# Patient Record
Sex: MALE | Race: WHITE | NOT HISPANIC OR LATINO | Employment: OTHER | URBAN - METROPOLITAN AREA
[De-identification: names, ages, dates, MRNs, and addresses within clinical notes are randomized per-mention and may not be internally consistent; named-entity substitution may affect disease eponyms.]

---

## 2023-10-20 ENCOUNTER — TELEPHONE (OUTPATIENT)
Dept: PRIMARY CARE | Facility: CLINIC | Age: 68
End: 2023-10-20
Payer: MEDICARE

## 2023-10-20 DIAGNOSIS — Z79.4 TYPE 2 DIABETES MELLITUS WITH RETINOPATHY, WITH LONG-TERM CURRENT USE OF INSULIN, MACULAR EDEMA PRESENCE UNSPECIFIED, UNSPECIFIED LATERALITY, UNSPECIFIED RETINOPATHY SEVERITY (MULTI): Primary | ICD-10-CM

## 2023-10-20 DIAGNOSIS — E11.319 TYPE 2 DIABETES MELLITUS WITH RETINOPATHY, WITH LONG-TERM CURRENT USE OF INSULIN, MACULAR EDEMA PRESENCE UNSPECIFIED, UNSPECIFIED LATERALITY, UNSPECIFIED RETINOPATHY SEVERITY (MULTI): Primary | ICD-10-CM

## 2023-10-20 RX ORDER — BLOOD-GLUCOSE SENSOR
EACH MISCELLANEOUS
Qty: 2 EACH | Refills: 11 | Status: SHIPPED | OUTPATIENT
Start: 2023-10-20 | End: 2024-05-14 | Stop reason: SDUPTHER

## 2023-10-20 NOTE — TELEPHONE ENCOUNTER
Patient walked in he is interested in Faith 3 reader and sensors but his pharmacy will not give he a price without a prescription.  Ph-[facility] [phone].  Patient phone [phone].

## 2023-11-03 ENCOUNTER — TELEPHONE (OUTPATIENT)
Dept: PRIMARY CARE | Facility: CLINIC | Age: 68
End: 2023-11-03
Payer: MEDICARE

## 2023-11-03 NOTE — TELEPHONE ENCOUNTER
[facility] left a message on rx refill line that patient ordered a freestyle jose III reader and paid out of pocket.  They can not release order without order signed and returned to them.  Reference #[id number].  I called patient he does want freestyle jose II from [facility].  I called [facility] and was told they faxed the form on 10/30/23 and today.

## 2023-12-01 ENCOUNTER — LAB (OUTPATIENT)
Dept: LAB | Facility: LAB | Age: 68
End: 2023-12-01
Payer: MEDICARE

## 2023-12-01 DIAGNOSIS — E11.3419: ICD-10-CM

## 2023-12-01 DIAGNOSIS — E11.9 TYPE 2 DIABETES MELLITUS WITHOUT COMPLICATIONS (MULTI): Primary | ICD-10-CM

## 2023-12-01 LAB
ALBUMIN SERPL BCP-MCNC: 4 G/DL (ref 3.4–5)
ALP SERPL-CCNC: 67 U/L (ref 33–136)
ALT SERPL W P-5'-P-CCNC: 19 U/L (ref 10–52)
ANION GAP SERPL CALC-SCNC: 14 MMOL/L (ref 10–20)
AST SERPL W P-5'-P-CCNC: 16 U/L (ref 9–39)
BILIRUB SERPL-MCNC: 0.5 MG/DL (ref 0–1.2)
BUN SERPL-MCNC: 23 MG/DL (ref 6–23)
CALCIUM SERPL-MCNC: 9 MG/DL (ref 8.6–10.6)
CHLORIDE SERPL-SCNC: 100 MMOL/L (ref 98–107)
CHOLEST SERPL-MCNC: 160 MG/DL (ref 0–199)
CHOLESTEROL/HDL RATIO: 3.1
CO2 SERPL-SCNC: 23 MMOL/L (ref 21–32)
CREAT SERPL-MCNC: 1.13 MG/DL (ref 0.5–1.3)
EST. AVERAGE GLUCOSE BLD GHB EST-MCNC: 174 MG/DL
GFR SERPL CREATININE-BSD FRML MDRD: 72 ML/MIN/1.73M*2
GLUCOSE SERPL-MCNC: 195 MG/DL (ref 74–99)
HBA1C MFR BLD: 7.7 %
HDLC SERPL-MCNC: 52 MG/DL
LDLC SERPL CALC-MCNC: 95 MG/DL
NON HDL CHOLESTEROL: 108 MG/DL (ref 0–149)
POTASSIUM SERPL-SCNC: 4.9 MMOL/L (ref 3.5–5.3)
PROT SERPL-MCNC: 6.2 G/DL (ref 6.4–8.2)
SODIUM SERPL-SCNC: 132 MMOL/L (ref 136–145)
TRIGL SERPL-MCNC: 67 MG/DL (ref 0–149)
VLDL: 13 MG/DL (ref 0–40)

## 2023-12-01 PROCEDURE — 36415 COLL VENOUS BLD VENIPUNCTURE: CPT

## 2023-12-01 PROCEDURE — 83036 HEMOGLOBIN GLYCOSYLATED A1C: CPT

## 2023-12-01 PROCEDURE — 80061 LIPID PANEL: CPT

## 2023-12-01 PROCEDURE — 80053 COMPREHEN METABOLIC PANEL: CPT

## 2023-12-08 ENCOUNTER — LAB (OUTPATIENT)
Dept: LAB | Facility: LAB | Age: 68
End: 2023-12-08
Payer: MEDICARE

## 2023-12-08 ENCOUNTER — OFFICE VISIT (OUTPATIENT)
Dept: PRIMARY CARE | Facility: CLINIC | Age: 68
End: 2023-12-08
Payer: MEDICARE

## 2023-12-08 VITALS
HEART RATE: 89 BPM | OXYGEN SATURATION: 98 % | HEIGHT: 74 IN | SYSTOLIC BLOOD PRESSURE: 131 MMHG | WEIGHT: 256 LBS | TEMPERATURE: 96.8 F | BODY MASS INDEX: 32.85 KG/M2 | DIASTOLIC BLOOD PRESSURE: 65 MMHG

## 2023-12-08 DIAGNOSIS — E78.00 PURE HYPERCHOLESTEROLEMIA: ICD-10-CM

## 2023-12-08 DIAGNOSIS — I10 HYPERTENSION, UNSPECIFIED TYPE: Primary | ICD-10-CM

## 2023-12-08 DIAGNOSIS — E55.9 VITAMIN D DEFICIENCY: ICD-10-CM

## 2023-12-08 DIAGNOSIS — E11.42 DIABETIC POLYNEUROPATHY ASSOCIATED WITH TYPE 2 DIABETES MELLITUS (MULTI): ICD-10-CM

## 2023-12-08 DIAGNOSIS — Z79.4 TYPE 2 DIABETES MELLITUS WITH RETINOPATHY, WITH LONG-TERM CURRENT USE OF INSULIN, MACULAR EDEMA PRESENCE UNSPECIFIED, UNSPECIFIED LATERALITY, UNSPECIFIED RETINOPATHY SEVERITY (MULTI): ICD-10-CM

## 2023-12-08 DIAGNOSIS — E11.319 TYPE 2 DIABETES MELLITUS WITH RETINOPATHY, WITH LONG-TERM CURRENT USE OF INSULIN, MACULAR EDEMA PRESENCE UNSPECIFIED, UNSPECIFIED LATERALITY, UNSPECIFIED RETINOPATHY SEVERITY (MULTI): ICD-10-CM

## 2023-12-08 PROBLEM — R05.9 COUGH: Status: ACTIVE | Noted: 2023-12-08

## 2023-12-08 PROBLEM — E11.9 DIABETES (MULTI): Status: ACTIVE | Noted: 2023-12-08

## 2023-12-08 PROBLEM — E11.40 DIABETIC NEUROPATHY (MULTI): Status: ACTIVE | Noted: 2023-12-08

## 2023-12-08 PROBLEM — R53.83 FATIGUE: Status: ACTIVE | Noted: 2023-12-08

## 2023-12-08 PROBLEM — U07.1 COVID-19 VIRUS INFECTION: Status: ACTIVE | Noted: 2023-12-08

## 2023-12-08 PROBLEM — R42 DIZZINESS: Status: ACTIVE | Noted: 2023-12-08

## 2023-12-08 PROCEDURE — 3075F SYST BP GE 130 - 139MM HG: CPT | Performed by: NURSE PRACTITIONER

## 2023-12-08 PROCEDURE — 82570 ASSAY OF URINE CREATININE: CPT

## 2023-12-08 PROCEDURE — 1126F AMNT PAIN NOTED NONE PRSNT: CPT | Performed by: NURSE PRACTITIONER

## 2023-12-08 PROCEDURE — 3051F HG A1C>EQUAL 7.0%<8.0%: CPT | Performed by: NURSE PRACTITIONER

## 2023-12-08 PROCEDURE — 3078F DIAST BP <80 MM HG: CPT | Performed by: NURSE PRACTITIONER

## 2023-12-08 PROCEDURE — 4010F ACE/ARB THERAPY RXD/TAKEN: CPT | Performed by: NURSE PRACTITIONER

## 2023-12-08 PROCEDURE — 84156 ASSAY OF PROTEIN URINE: CPT

## 2023-12-08 PROCEDURE — 1159F MED LIST DOCD IN RCRD: CPT | Performed by: NURSE PRACTITIONER

## 2023-12-08 PROCEDURE — 1160F RVW MEDS BY RX/DR IN RCRD: CPT | Performed by: NURSE PRACTITIONER

## 2023-12-08 PROCEDURE — 99214 OFFICE O/P EST MOD 30 MIN: CPT | Performed by: NURSE PRACTITIONER

## 2023-12-08 PROCEDURE — 1036F TOBACCO NON-USER: CPT | Performed by: NURSE PRACTITIONER

## 2023-12-08 PROCEDURE — 3048F LDL-C <100 MG/DL: CPT | Performed by: NURSE PRACTITIONER

## 2023-12-08 PROCEDURE — 3060F POS MICROALBUMINURIA REV: CPT | Performed by: NURSE PRACTITIONER

## 2023-12-08 RX ORDER — INSULIN LISPRO 100 [IU]/ML
INJECTION, SOLUTION INTRAVENOUS; SUBCUTANEOUS
COMMUNITY
End: 2023-12-08 | Stop reason: SDUPTHER

## 2023-12-08 RX ORDER — LORATADINE, PSEUDOEPHEDRINE 5; 120 MG/1; MG/1
TABLET, EXTENDED RELEASE ORAL
COMMUNITY
Start: 2023-11-07

## 2023-12-08 RX ORDER — ACETAMINOPHEN 500 MG
1000 TABLET ORAL DAILY
COMMUNITY
Start: 2021-12-06

## 2023-12-08 RX ORDER — GLUCOSAM/CHONDRO/HERB 149/HYAL 750-100 MG
1000 TABLET ORAL 2 TIMES DAILY
COMMUNITY

## 2023-12-08 RX ORDER — INSULIN GLARGINE 300 U/ML
20 INJECTION, SOLUTION SUBCUTANEOUS NIGHTLY
COMMUNITY

## 2023-12-08 RX ORDER — MULTIVIT-MIN/IRON FUM/FOLIC AC 7.5 MG-4
1 TABLET ORAL DAILY
COMMUNITY

## 2023-12-08 RX ORDER — INSULIN LISPRO 100 [IU]/ML
INJECTION, SOLUTION INTRAVENOUS; SUBCUTANEOUS
Qty: 30 EACH | Refills: 3 | Status: SHIPPED | OUTPATIENT
Start: 2023-12-08

## 2023-12-08 RX ORDER — LISINOPRIL 20 MG/1
20 TABLET ORAL DAILY
COMMUNITY
End: 2024-01-29 | Stop reason: SDUPTHER

## 2023-12-08 RX ORDER — ASPIRIN 81 MG/1
81 TABLET ORAL DAILY
COMMUNITY
Start: 2014-03-11

## 2023-12-08 RX ORDER — ATORVASTATIN CALCIUM 10 MG/1
10 TABLET, FILM COATED ORAL DAILY
COMMUNITY

## 2023-12-08 ASSESSMENT — PAIN SCALES - GENERAL: PAINLEVEL: 0-NO PAIN

## 2023-12-08 ASSESSMENT — PATIENT HEALTH QUESTIONNAIRE - PHQ9
2. FEELING DOWN, DEPRESSED OR HOPELESS: NOT AT ALL
SUM OF ALL RESPONSES TO PHQ9 QUESTIONS 1 AND 2: 0
1. LITTLE INTEREST OR PLEASURE IN DOING THINGS: NOT AT ALL

## 2023-12-08 NOTE — PROGRESS NOTES
Chief Complaint  Diabetes and Hypertension.    History Of Present Illness  Chris Andrade is a 68 y.o. male presents today for follow up of Diabetes and Hypertension.    Has been exercising and dieting (limiting carbs).  Continues to loose weight (intentional).     Hypertension- compliant w medications. Since last visit evaluated by cardiologist Dr Jean (referred due to CT Cardiac score 299).  Started on ASA 81 daily and advised to increase statin to 20 mg daily.  States had tried this however experienced more muscle aches and has went down to atorvastatin 10 mg again.  Reports occasional dizziness with changes in position, mildly Ortho VS positive again today.    Has not been drinking enough water and has been drinking lots of coffee over the last several weeks.    Hyperlipidemia-continues on atorvastatin 10 mg daily.  Did not tolerate increased to 20 mg as was suggested by cardiologist Dr Jean.  Labs reviewed.  Has improved lifestyle-diet and exercise.     Diabetes with neuropathy/diabetes with retinopathy  -Last hemoglobin A1c 12/1/2023 was 7.7  -Monitors blood sugars with freestyle jose continuous glucose monitor  -Toujeo to 20 u at night   -Take humalog 10-15 u with meals  -Continue to follow-up with ophthalmology Dr. [facility]-Retinal steoid injection 1 month ago [facility].   -Podiatry referral recommended, advised patient to avoid barefoot ambulation, always needs protective feet coverings (shoes) to avoid accidental injury     HM  -Immunizations: flu- reviewed, declined, Recommended: COVID vacc, shingles, pneum and DTaP  -Routine dental visit every 6 months recommended  Mom had Guillain-Homeworth after influenza vaccine, therefore states he will not be taking the influenza vaccines now or in future.   Reviewed Medications, PMH, PSH, social hx, Fam hx, Immunizations, medications and allergies.      Pneumonia vaccine- declines at this time. Mom had guillan Homeworth therefore does not  "want vaccines.         Review of Systems  Review of Systems   Constitutional:  Negative for activity change and appetite change.   HENT:  Negative for congestion.    Eyes:  Negative for pain and itching.   Respiratory:  Negative for cough, shortness of breath and wheezing.    Cardiovascular:  Negative for chest pain, palpitations and leg swelling.   Gastrointestinal:  Negative for abdominal distention, abdominal pain, constipation, diarrhea and vomiting.   Genitourinary:  Negative for dysuria, frequency, hematuria and urgency.   Musculoskeletal:  Negative for arthralgias and gait problem.   Skin:  Negative for rash and wound.   Neurological:  Positive for dizziness. Negative for numbness.   Psychiatric/Behavioral:  The patient is not nervous/anxious.        Past Medical History  He has no past medical history on file.    Surgical History  He has no past surgical history on file.    Family History  No family history on file.     Social History  He reports that he has quit smoking. His smoking use included cigarettes. He has never used smokeless tobacco. He reports that he does not currently use alcohol. He reports that he does not use drugs.    Allergies  Patient has no known allergies.    Medications  Current Outpatient Medications   Medication Instructions    aspirin 81 mg, oral, Daily    atorvastatin (LIPITOR) 10 mg, oral, Daily    blood-glucose sensor (FreeStyle Faith 3 Sensor) device Apply 1 sensor every 2 weeks    cholecalciferol (VITAMIN D-3) 1,000 Units, oral, Daily    HumaLOG KwikPen Insulin 100 unit/mL injection inject 30 units three times a day to cover daily meals    lisinopril 20 mg, oral, Daily    multivitamin with minerals tablet 1 tablet, oral, Daily    omega 3-dha-epa-fish oil (Fish OiL) 1,000 mg (120 mg-180 mg) capsule 1,000 mg, oral, 2 times daily    Toujeo SoloStar U-300 Insulin 300 unit/mL (1.5 mL) injection Inject 20 Units under the skin once daily at bedtime.    Unifine Pentips 31 gauge x 3/16\" " "needle Use to inject insulin 4 times daily        Objective   /65   Pulse 89   Temp 36 °C (96.8 °F) (Temporal)   Ht 1.88 m (6' 2\")   Wt 116 kg (256 lb)   SpO2 98%   BMI 32.87 kg/m²    BMI: Estimated body mass index is 32.87 kg/m² as calculated from the following:    Height as of this encounter: 1.88 m (6' 2\").    Weight as of this encounter: 116 kg (256 lb).    Physical Exam  Physical Exam  Vitals and nursing note reviewed.   Constitutional:       Appearance: Normal appearance.   HENT:      Head: Normocephalic and atraumatic.      Nose: Nose normal.      Mouth/Throat:      Mouth: Mucous membranes are moist.      Pharynx: Oropharynx is clear.   Eyes:      Extraocular Movements: Extraocular movements intact.      Conjunctiva/sclera: Conjunctivae normal.      Pupils: Pupils are equal, round, and reactive to light.   Cardiovascular:      Rate and Rhythm: Normal rate and regular rhythm.      Pulses: Normal pulses.      Heart sounds: Normal heart sounds.   Pulmonary:      Effort: Pulmonary effort is normal.      Breath sounds: Normal breath sounds.   Abdominal:      General: Bowel sounds are normal.      Palpations: Abdomen is soft.      Tenderness: There is no abdominal tenderness.   Musculoskeletal:         General: Normal range of motion.      Cervical back: Neck supple.   Skin:     General: Skin is warm and dry.   Neurological:      General: No focal deficit present.      Mental Status: He is alert and oriented to person, place, and time. Mental status is at baseline.   Psychiatric:         Mood and Affect: Mood normal.         Behavior: Behavior normal.         Thought Content: Thought content normal.         Judgment: Judgment normal.         Relevant Results and Imaging  Lab on 12/08/2023   Component Date Value Ref Range Status    Total Protein, Urine Random 12/08/2023 42 (H)  5 - 25 mg/dL Final    Creatinine, Urine Random 12/08/2023 59.8  20.0 - 370.0 mg/dL Final    T. Protein/Creatinine Ratio " 12/08/2023 0.70 (H)  0.00 - 0.17 mg/mg Creat Final   Lab on 12/01/2023   Component Date Value Ref Range Status    Glucose 12/01/2023 195 (H)  74 - 99 mg/dL Final    Sodium 12/01/2023 132 (L)  136 - 145 mmol/L Final    Potassium 12/01/2023 4.9  3.5 - 5.3 mmol/L Final    Chloride 12/01/2023 100  98 - 107 mmol/L Final    Bicarbonate 12/01/2023 23  21 - 32 mmol/L Final    Anion Gap 12/01/2023 14  10 - 20 mmol/L Final    Urea Nitrogen 12/01/2023 23  6 - 23 mg/dL Final    Creatinine 12/01/2023 1.13  0.50 - 1.30 mg/dL Final    eGFR 12/01/2023 72  >60 mL/min/1.73m*2 Final    Calculations of estimated GFR are performed using the 2021 CKD-EPI Study Refit equation without the race variable for the IDMS-Traceable creatinine methods.  https://jasn.asnjournals.org/content/early/2021/09/22/ASN.6220958561    Calcium 12/01/2023 9.0  8.6 - 10.6 mg/dL Final    Albumin 12/01/2023 4.0  3.4 - 5.0 g/dL Final    Alkaline Phosphatase 12/01/2023 67  33 - 136 U/L Final    Total Protein 12/01/2023 6.2 (L)  6.4 - 8.2 g/dL Final    AST 12/01/2023 16  9 - 39 U/L Final    Bilirubin, Total 12/01/2023 0.5  0.0 - 1.2 mg/dL Final    ALT 12/01/2023 19  10 - 52 U/L Final    Patients treated with Sulfasalazine may generate falsely decreased results for ALT.    Hemoglobin A1C 12/01/2023 7.7 (H)  see below % Final    Estimated Average Glucose 12/01/2023 174  Not Established mg/dL Final    Cholesterol 12/01/2023 160  0 - 199 mg/dL Final          Age      Desirable   Borderline High   High     0-19 Y     0 - 169       170 - 199     >/= 200    20-24 Y     0 - 189       190 - 224     >/= 225         >24 Y     0 - 199       200 - 239     >/= 240   **All ranges are based on fasting samples. Specific   therapeutic targets will vary based on patient-specific   cardiac risk.    Pediatric guidelines reference:Pediatrics 2011, 128(S5).Adult guidelines reference: NCEP ATPIII Guidelines,KALIN 2001, 258:2486-97    Venipuncture immediately after or during the  administration of Metamizole may lead to falsely low results. Testing should be performed immediately prior to Metamizole dosing.    HDL-Cholesterol 12/01/2023 52.0  mg/dL Final      Age       Very Low   Low     Normal    High    0-19 Y    < 35      < 40     40-45     ----  20-24 Y    ----     < 40      >45      ----        >24 Y      ----     < 40     40-60      >60      Cholesterol/HDL Ratio 12/01/2023 3.1   Final      Ref Values  Desirable  < 3.4  High Risk  > 5.0    LDL Calculated 12/01/2023 95  <=99 mg/dL Final                                Near   Borderline      AGE      Desirable  Optimal    High     High     Very High     0-19 Y     0 - 109     ---    110-129   >/= 130     ----    20-24 Y     0 - 119     ---    120-159   >/= 160     ----      >24 Y     0 -  99   100-129  130-159   160-189     >/=190      VLDL 12/01/2023 13  0 - 40 mg/dL Final    Triglycerides 12/01/2023 67  0 - 149 mg/dL Final       Age         Desirable   Borderline High   High     Very High   0 D-90 D    19 - 174         ----         ----        ----  91 D- 9 Y     0 -  74        75 -  99     >/= 100      ----    10-19 Y     0 -  89        90 - 129     >/= 130      ----    20-24 Y     0 - 114       115 - 149     >/= 150      ----         >24 Y     0 - 149       150 - 199    200- 499    >/= 500    Venipuncture immediately after or during the administration of Metamizole may lead to falsely low results. Testing should be performed immediately prior to Metamizole dosing.    Non HDL Cholesterol 12/01/2023 108  0 - 149 mg/dL Final          Age       Desirable   Borderline High   High     Very High     0-19 Y     0 - 119       120 - 144     >/= 145    >/= 160    20-24 Y     0 - 149       150 - 189     >/= 190      ----         >24 Y    30 mg/dL above LDL Cholesterol goal       No images are attached to the encounter.        Assessment and Plan  Assessment/Plan   Problem List Items Addressed This Visit             ICD-10-CM    Diabetic  neuropathy (CMS/HCC) E11.40    Relevant Orders    Protein, Urine Random (Completed)    Follow Up In Primary Care - Established    Basic metabolic panel    Hemoglobin A1C    Hypertension - Primary I10     -chronic, stable   -8/24/23 CT Cardiac score 299  -follows w Cards Dr Jean   -cont ASA, lisinopril 20 mg   -ortho VS+-- advised to increase hydration          Relevant Orders    Follow Up In Primary Care - Established    Pure hypercholesterolemia E78.00     -chronic, stable   -lipid panel 12/1/23- wnl   -cont atorvastatin 10 mg at this time (did not tolerate increase to 20 mg as advised by cardiologist Dr Jean due to elevated CT Cardiac score (299) --due to muscle aches)         Type 2 diabetes mellitus (CMS/HCC) E11.9     -did not tolerate metformin in past ([facility])   --Last hemoglobin A1c 12/1/2023 was 7.7  -Monitors blood sugars with freestyle jose continuous glucose monitor  -Toujeo to 20 u at night   -Take humalog 10-15 u with meals  -Continue to follow-up with ophthalmology Dr[facility]-Retinal steoid injection 1 month ago [facility].   -Podiatry referral recommended, advised patient to avoid barefoot ambulation, always needs protective feet coverings (shoes) to avoid accidental injury         Relevant Medications    HumaLOG KwikPen Insulin 100 unit/mL injection     Other Visit Diagnoses         Codes    Vitamin D deficiency     E55.9    Relevant Orders    Vitamin D 25-Hydroxy,Total (for eval of Vitamin D levels)

## 2023-12-09 LAB
CREAT UR-MCNC: 59.8 MG/DL (ref 20–370)
PROT UR-ACNC: 42 MG/DL (ref 5–25)
PROT/CREAT UR: 0.7 MG/MG CREAT (ref 0–0.17)

## 2023-12-09 ASSESSMENT — ENCOUNTER SYMPTOMS
COUGH: 0
DIARRHEA: 0
APPETITE CHANGE: 0
ACTIVITY CHANGE: 0
CONSTIPATION: 0
NUMBNESS: 0
DYSURIA: 0
SHORTNESS OF BREATH: 0
NERVOUS/ANXIOUS: 0
EYE ITCHING: 0
ARTHRALGIAS: 0
DIZZINESS: 1
ABDOMINAL PAIN: 0
HEMATURIA: 0
ABDOMINAL DISTENTION: 0
PALPITATIONS: 0
WHEEZING: 0
EYE PAIN: 0
VOMITING: 0
WOUND: 0
FREQUENCY: 0

## 2023-12-09 NOTE — ASSESSMENT & PLAN NOTE
-chronic, stable   -8/24/23 CT Cardiac score 299  -follows w Lu Jean   -cont ASA, lisinopril 20 mg   -ortho VS+-- advised to increase hydration

## 2023-12-09 NOTE — ASSESSMENT & PLAN NOTE
-did not tolerate metformin in past ([facility])   --Last hemoglobin A1c 12/1/2023 was 7.7  -Monitors blood sugars with freestyle jose continuous glucose monitor  -Toujeo to 20 u at night   -Take humalog 10-15 u with meals  -Continue to follow-up with ophthalmology Dr. [facility]-Retinal steoid injection 1 month ago [facility].   -Podiatry referral recommended, advised patient to avoid barefoot ambulation, always needs protective feet coverings (shoes) to avoid accidental injury

## 2023-12-09 NOTE — ASSESSMENT & PLAN NOTE
-chronic, stable   -lipid panel 12/1/23- wnl   -cont atorvastatin 10 mg at this time (did not tolerate increase to 20 mg as advised by cardiologist Dr Jean due to elevated CT Cardiac score (299) --due to muscle aches)

## 2023-12-21 PROBLEM — Z13.6 SCREENING FOR AAA (ABDOMINAL AORTIC ANEURYSM): Status: ACTIVE | Noted: 2023-12-21

## 2024-01-02 ENCOUNTER — APPOINTMENT (OUTPATIENT)
Dept: CARDIOLOGY | Facility: CLINIC | Age: 69
End: 2024-01-02
Payer: MEDICARE

## 2024-01-29 ENCOUNTER — TELEPHONE (OUTPATIENT)
Dept: PRIMARY CARE | Facility: CLINIC | Age: 69
End: 2024-01-29
Payer: MEDICARE

## 2024-01-29 DIAGNOSIS — I10 PRIMARY HYPERTENSION: Primary | ICD-10-CM

## 2024-01-29 RX ORDER — LISINOPRIL 20 MG/1
20 TABLET ORAL DAILY
Qty: 90 TABLET | Refills: 3 | Status: SHIPPED | OUTPATIENT
Start: 2024-01-29 | End: 2025-01-28

## 2024-01-29 NOTE — TELEPHONE ENCOUNTER
Patient called in requesting a refill on lisinopril 20 mg take 1 daily [facility] [place] [place] [address]

## 2024-04-08 ENCOUNTER — APPOINTMENT (OUTPATIENT)
Dept: PRIMARY CARE | Facility: CLINIC | Age: 69
End: 2024-04-08
Payer: MEDICARE

## 2024-04-08 PROBLEM — R05.9 COUGH: Status: RESOLVED | Noted: 2023-12-08 | Resolved: 2024-04-08

## 2024-04-08 PROBLEM — U07.1 COVID-19 VIRUS INFECTION: Status: RESOLVED | Noted: 2023-12-08 | Resolved: 2024-04-08

## 2024-04-09 ENCOUNTER — OFFICE VISIT (OUTPATIENT)
Dept: PRIMARY CARE | Facility: CLINIC | Age: 69
End: 2024-04-09
Payer: MEDICARE

## 2024-04-09 VITALS
SYSTOLIC BLOOD PRESSURE: 145 MMHG | HEART RATE: 86 BPM | BODY MASS INDEX: 33.22 KG/M2 | WEIGHT: 258.7 LBS | TEMPERATURE: 97.4 F | OXYGEN SATURATION: 98 % | DIASTOLIC BLOOD PRESSURE: 69 MMHG

## 2024-04-09 DIAGNOSIS — E11.42 DIABETIC POLYNEUROPATHY ASSOCIATED WITH TYPE 2 DIABETES MELLITUS (MULTI): ICD-10-CM

## 2024-04-09 DIAGNOSIS — I10 HYPERTENSION, UNSPECIFIED TYPE: ICD-10-CM

## 2024-04-09 DIAGNOSIS — N52.9 ERECTILE DYSFUNCTION, UNSPECIFIED ERECTILE DYSFUNCTION TYPE: Primary | ICD-10-CM

## 2024-04-09 PROCEDURE — 3078F DIAST BP <80 MM HG: CPT | Performed by: NURSE PRACTITIONER

## 2024-04-09 PROCEDURE — 99214 OFFICE O/P EST MOD 30 MIN: CPT | Performed by: NURSE PRACTITIONER

## 2024-04-09 PROCEDURE — 4010F ACE/ARB THERAPY RXD/TAKEN: CPT | Performed by: NURSE PRACTITIONER

## 2024-04-09 PROCEDURE — 3077F SYST BP >= 140 MM HG: CPT | Performed by: NURSE PRACTITIONER

## 2024-04-09 RX ORDER — SILDENAFIL 100 MG/1
100 TABLET, FILM COATED ORAL AS NEEDED
Qty: 4 TABLET | Refills: 11 | Status: SHIPPED | OUTPATIENT
Start: 2024-04-09 | End: 2024-04-09 | Stop reason: SDUPTHER

## 2024-04-09 RX ORDER — SILDENAFIL 100 MG/1
100 TABLET, FILM COATED ORAL AS NEEDED
Qty: 30 TABLET | Refills: 0 | Status: SHIPPED | OUTPATIENT
Start: 2024-04-09

## 2024-04-09 ASSESSMENT — ENCOUNTER SYMPTOMS
DIARRHEA: 0
ABDOMINAL DISTENTION: 0
APPETITE CHANGE: 0
ACTIVITY CHANGE: 0
WHEEZING: 0
EYE PAIN: 0
ABDOMINAL PAIN: 0
CONSTIPATION: 0
VOMITING: 0
PALPITATIONS: 0
HEMATURIA: 0
SHORTNESS OF BREATH: 0
FREQUENCY: 0
NUMBNESS: 0
EYE ITCHING: 0
NERVOUS/ANXIOUS: 0
DYSURIA: 0
COUGH: 0
ARTHRALGIAS: 0
WOUND: 0

## 2024-04-09 NOTE — PROGRESS NOTES
Chief Complaint  Diabetes.    History Of Present Illness  Chris Andrade is a 68 y.o. male presents today for follow up of Diabetes.  Diabetes  -Last hemoglobin A1c 12/1/2023 was 7.7  -Monitors blood sugars with freestyle jose continuous glucose monitor.  Does experience hypoglycemia frequently-multiple times per week blood sugar in the 60s.  At these times feels shaky, diaphoretic.  States he takes orange juice or hot water with honey and this usually improves his blood sugars.  -Toujeo to 20 u at night   -Takes humalog 10-15 u with meals -dependent on blood sugar.  States at times he does take insulin without food if the blood sugar is in the 300s.  Reviewed importance of timing of checking blood sugars and appropriate dosing of insulin.  Advised to be monitoring blood sugars Premeal.  Advised to decrease Humalog to 10 units Premeal, avoid taking Humalog insulin without meals.  -Continue to follow-up with ophthalmology Dr. [facility]-had steroid injection in L eye- does not note any improvement.     States has been experiencing erectile dysfunction.  This is a chronic problem.  Has not tried any medication for this in the past.  Would like to try Viagra.    Review of Systems  Review of Systems   Constitutional:  Negative for activity change and appetite change.   HENT:  Negative for congestion.    Eyes:  Negative for pain and itching.   Respiratory:  Negative for cough, shortness of breath and wheezing.    Cardiovascular:  Negative for chest pain, palpitations and leg swelling.   Gastrointestinal:  Negative for abdominal distention, abdominal pain, constipation, diarrhea and vomiting.   Genitourinary:  Negative for dysuria, frequency, hematuria and urgency.   Musculoskeletal:  Negative for arthralgias and gait problem.   Skin:  Negative for rash and wound.   Neurological:  Negative for numbness.   Psychiatric/Behavioral:  The patient is not nervous/anxious.        Past Medical  "History  He has a past medical history of COVID-19 virus infection (12/08/2023).    Surgical History  He has no past surgical history on file.    Family History  Family History   Problem Relation Name Age of Onset    Other (guillan barre) Mother          s/p influenza vaccine        Social History  He reports that he has quit smoking. His smoking use included cigarettes. He has never used smokeless tobacco. He reports that he does not currently use alcohol. He reports that he does not use drugs.    DEPRESSION SCREEN         Allergies  Patient has no known allergies.    Medications  Current Outpatient Medications   Medication Instructions    aspirin 81 mg, oral, Daily    atorvastatin (LIPITOR) 10 mg, oral, Daily    blood-glucose sensor (FreeStyle Faith 3 Sensor) device Apply 1 sensor every 2 weeks    cholecalciferol (VITAMIN D-3) 1,000 Units, oral, Daily    HumaLOG KwikPen Insulin 100 unit/mL injection inject 30 units three times a day to cover daily meals    lisinopril 20 mg, oral, Daily    multivitamin with minerals tablet 1 tablet, oral, Daily    omega 3-dha-epa-fish oil (Fish OiL) 1,000 mg (120 mg-180 mg) capsule 1,000 mg, oral, 2 times daily    sildenafil (VIAGRA) 100 mg, oral, As needed    Toujeo SoloStar U-300 Insulin 300 unit/mL (1.5 mL) injection Inject 20 Units under the skin once daily at bedtime.    Unifine Pentips 31 gauge x 3/16\" needle Use to inject insulin 4 times daily        Objective   /69   Pulse 86   Temp 36.3 °C (97.4 °F)   Wt 117 kg (258 lb 11.2 oz)   SpO2 98%   BMI 33.22 kg/m²    BMI: Estimated body mass index is 33.22 kg/m² as calculated from the following:    Height as of 12/8/23: 1.88 m (6' 2\").    Weight as of this encounter: 117 kg (258 lb 11.2 oz).  BP Readings from Last 6 Encounters:   04/09/24 145/69   12/08/23 131/65   09/27/23 146/70   09/01/23 133/58   07/31/23 118/53   06/21/23 120/60       Physical Exam  Physical Exam  Vitals and nursing note reviewed.   Constitutional: "       Appearance: Normal appearance.   HENT:      Head: Normocephalic and atraumatic.      Nose: Nose normal.      Mouth/Throat:      Mouth: Mucous membranes are moist.      Pharynx: Oropharynx is clear.   Eyes:      Extraocular Movements: Extraocular movements intact.      Conjunctiva/sclera: Conjunctivae normal.      Pupils: Pupils are equal, round, and reactive to light.   Cardiovascular:      Rate and Rhythm: Normal rate and regular rhythm.      Pulses: Normal pulses.      Heart sounds: Normal heart sounds.   Pulmonary:      Effort: Pulmonary effort is normal.      Breath sounds: Normal breath sounds.   Abdominal:      General: Bowel sounds are normal.      Palpations: Abdomen is soft.      Tenderness: There is no abdominal tenderness.   Musculoskeletal:         General: Normal range of motion.      Cervical back: Neck supple.   Skin:     General: Skin is warm and dry.   Neurological:      General: No focal deficit present.      Mental Status: He is alert and oriented to person, place, and time. Mental status is at baseline.   Psychiatric:         Mood and Affect: Mood normal.         Behavior: Behavior normal.         Thought Content: Thought content normal.         Judgment: Judgment normal.         Relevant Results and Imaging  Lab on 12/08/2023   Component Date Value Ref Range Status    Total Protein, Urine Random 12/08/2023 42 (H)  5 - 25 mg/dL Final    Creatinine, Urine Random 12/08/2023 59.8  20.0 - 370.0 mg/dL Final    T. Protein/Creatinine Ratio 12/08/2023 0.70 (H)  0.00 - 0.17 mg/mg Creat Final   Lab on 12/01/2023   Component Date Value Ref Range Status    Glucose 12/01/2023 195 (H)  74 - 99 mg/dL Final    Sodium 12/01/2023 132 (L)  136 - 145 mmol/L Final    Potassium 12/01/2023 4.9  3.5 - 5.3 mmol/L Final    Chloride 12/01/2023 100  98 - 107 mmol/L Final    Bicarbonate 12/01/2023 23  21 - 32 mmol/L Final    Anion Gap 12/01/2023 14  10 - 20 mmol/L Final    Urea Nitrogen 12/01/2023 23  6 - 23 mg/dL  Final    Creatinine 12/01/2023 1.13  0.50 - 1.30 mg/dL Final    eGFR 12/01/2023 72  >60 mL/min/1.73m*2 Final    Calculations of estimated GFR are performed using the 2021 CKD-EPI Study Refit equation without the race variable for the IDMS-Traceable creatinine methods.  https://jasn.asnjournals.org/content/early/2021/09/22/ASN.1275337125    Calcium 12/01/2023 9.0  8.6 - 10.6 mg/dL Final    Albumin 12/01/2023 4.0  3.4 - 5.0 g/dL Final    Alkaline Phosphatase 12/01/2023 67  33 - 136 U/L Final    Total Protein 12/01/2023 6.2 (L)  6.4 - 8.2 g/dL Final    AST 12/01/2023 16  9 - 39 U/L Final    Bilirubin, Total 12/01/2023 0.5  0.0 - 1.2 mg/dL Final    ALT 12/01/2023 19  10 - 52 U/L Final    Patients treated with Sulfasalazine may generate falsely decreased results for ALT.    Hemoglobin A1C 12/01/2023 7.7 (H)  see below % Final    Estimated Average Glucose 12/01/2023 174  Not Established mg/dL Final    Cholesterol 12/01/2023 160  0 - 199 mg/dL Final          Age      Desirable   Borderline High   High     0-19 Y     0 - 169       170 - 199     >/= 200    20-24 Y     0 - 189       190 - 224     >/= 225         >24 Y     0 - 199       200 - 239     >/= 240   **All ranges are based on fasting samples. Specific   therapeutic targets will vary based on patient-specific   cardiac risk.    Pediatric guidelines reference:Pediatrics 2011, 128(S5).Adult guidelines reference: NCEP ATPIII Guidelines,KALIN 2001, 258:2486-97    Venipuncture immediately after or during the administration of Metamizole may lead to falsely low results. Testing should be performed immediately prior to Metamizole dosing.    HDL-Cholesterol 12/01/2023 52.0  mg/dL Final      Age       Very Low   Low     Normal    High    0-19 Y    < 35      < 40     40-45     ----  20-24 Y    ----     < 40      >45      ----        >24 Y      ----     < 40     40-60      >60      Cholesterol/HDL Ratio 12/01/2023 3.1   Final      Ref Values  Desirable  < 3.4  High Risk  > 5.0     LDL Calculated 12/01/2023 95  <=99 mg/dL Final                                Near   Borderline      AGE      Desirable  Optimal    High     High     Very High     0-19 Y     0 - 109     ---    110-129   >/= 130     ----    20-24 Y     0 - 119     ---    120-159   >/= 160     ----      >24 Y     0 -  99   100-129  130-159   160-189     >/=190      VLDL 12/01/2023 13  0 - 40 mg/dL Final    Triglycerides 12/01/2023 67  0 - 149 mg/dL Final       Age         Desirable   Borderline High   High     Very High   0 D-90 D    19 - 174         ----         ----        ----  91 D- 9 Y     0 -  74        75 -  99     >/= 100      ----    10-19 Y     0 -  89        90 - 129     >/= 130      ----    20-24 Y     0 - 114       115 - 149     >/= 150      ----         >24 Y     0 - 149       150 - 199    200- 499    >/= 500    Venipuncture immediately after or during the administration of Metamizole may lead to falsely low results. Testing should be performed immediately prior to Metamizole dosing.    Non HDL Cholesterol 12/01/2023 108  0 - 149 mg/dL Final          Age       Desirable   Borderline High   High     Very High     0-19 Y     0 - 119       120 - 144     >/= 145    >/= 160    20-24 Y     0 - 149       150 - 189     >/= 190      ----         >24 Y    30 mg/dL above LDL Cholesterol goal       No images are attached to the encounter.        Assessment and Plan  Assessment/Plan   Problem List Items Addressed This Visit             ICD-10-CM    Diabetic neuropathy (CMS/HCC) E11.40     -see dm a/p         Hypertension I10     -chronic, stable   -8/24/23 CT Cardiac score 299  -follows w Cards Dr Jean   -cont ASA, lisinopril 20 mg          Erectile dysfunction - Primary N52.9    Relevant Medications    sildenafil (Viagra) 100 mg tablet

## 2024-04-09 NOTE — ASSESSMENT & PLAN NOTE
-chronic, stable   -8/24/23 CT Cardiac score 299  -follows w Lu Jean   -cont ASA, lisinopril 20 mg

## 2024-04-09 NOTE — ASSESSMENT & PLAN NOTE
-did not tolerate metformin in past ([facility])   -Last hemoglobin A1c 12/1/2023 was 7.7  -Monitors blood sugars with freestyle jose continuous glucose monitor.  Does experience hypoglycemia frequently-multiple times per week blood sugar in the 60s.  At these times feels shaky, diaphoretic.  States he takes orange juice or hot water with honey and this usually improves his blood sugars.  -Toujeo to 20 u at night   -Takes humalog 10-15 u with meals -dependent on blood sugar.  States at times he does take insulin without food if the blood sugar is in the 300s.  Reviewed importance of timing of checking blood sugars and appropriate dosing of insulin.  Advised to be monitoring blood sugars Premeal.  Advised to decrease Humalog to 10 units Premeal, avoid taking Humalog insulin without meals.  -Continue to follow-up with ophthalmology Dr. [facility]-had steroid injection in L eye- does not note any improvement  -Obtain Y3e--niblsjgcl on A1c may lower insulin further  -Follow-up September for Medicare wellness visit as well as chronic illness management

## 2024-04-10 ENCOUNTER — LAB (OUTPATIENT)
Dept: LAB | Facility: LAB | Age: 69
End: 2024-04-10
Payer: MEDICARE

## 2024-04-10 DIAGNOSIS — E11.42 DIABETIC POLYNEUROPATHY ASSOCIATED WITH TYPE 2 DIABETES MELLITUS (MULTI): ICD-10-CM

## 2024-04-10 DIAGNOSIS — E55.9 VITAMIN D DEFICIENCY: ICD-10-CM

## 2024-04-10 LAB
25(OH)D3 SERPL-MCNC: 37 NG/ML (ref 30–100)
ANION GAP SERPL CALC-SCNC: 13 MMOL/L (ref 10–20)
BUN SERPL-MCNC: 19 MG/DL (ref 6–23)
CALCIUM SERPL-MCNC: 9.1 MG/DL (ref 8.6–10.6)
CHLORIDE SERPL-SCNC: 100 MMOL/L (ref 98–107)
CO2 SERPL-SCNC: 24 MMOL/L (ref 21–32)
CREAT SERPL-MCNC: 1.07 MG/DL (ref 0.5–1.3)
EGFRCR SERPLBLD CKD-EPI 2021: 77 ML/MIN/1.73M*2
EST. AVERAGE GLUCOSE BLD GHB EST-MCNC: 169 MG/DL
GLUCOSE SERPL-MCNC: 179 MG/DL (ref 74–99)
HBA1C MFR BLD: 7.5 %
POTASSIUM SERPL-SCNC: 5.1 MMOL/L (ref 3.5–5.3)
SODIUM SERPL-SCNC: 132 MMOL/L (ref 136–145)

## 2024-04-10 PROCEDURE — 80048 BASIC METABOLIC PNL TOTAL CA: CPT

## 2024-04-10 PROCEDURE — 36415 COLL VENOUS BLD VENIPUNCTURE: CPT

## 2024-04-10 PROCEDURE — 82306 VITAMIN D 25 HYDROXY: CPT

## 2024-04-10 PROCEDURE — 83036 HEMOGLOBIN GLYCOSYLATED A1C: CPT

## 2024-04-12 ENCOUNTER — TELEPHONE (OUTPATIENT)
Dept: PRIMARY CARE | Facility: CLINIC | Age: 69
End: 2024-04-12
Payer: MEDICARE

## 2024-04-12 NOTE — TELEPHONE ENCOUNTER
----- Message from DAV Cuellar sent at 4/12/2024  8:15 AM EDT -----  A1c continues to improve- now 7.5.      Sodium is slightly low-- increase some salt in diet.     Vit D level is normal. Cont your Vit D supplement-- please verify patients home dose if able and update chart accordingly.

## 2024-05-14 ENCOUNTER — TELEPHONE (OUTPATIENT)
Dept: PRIMARY CARE | Facility: CLINIC | Age: 69
End: 2024-05-14
Payer: MEDICARE

## 2024-05-14 DIAGNOSIS — E11.319 TYPE 2 DIABETES MELLITUS WITH RETINOPATHY, WITH LONG-TERM CURRENT USE OF INSULIN, MACULAR EDEMA PRESENCE UNSPECIFIED, UNSPECIFIED LATERALITY, UNSPECIFIED RETINOPATHY SEVERITY (MULTI): ICD-10-CM

## 2024-05-14 DIAGNOSIS — Z79.4 TYPE 2 DIABETES MELLITUS WITH RETINOPATHY, WITH LONG-TERM CURRENT USE OF INSULIN, MACULAR EDEMA PRESENCE UNSPECIFIED, UNSPECIFIED LATERALITY, UNSPECIFIED RETINOPATHY SEVERITY (MULTI): ICD-10-CM

## 2024-05-14 RX ORDER — BLOOD-GLUCOSE SENSOR
EACH MISCELLANEOUS
Qty: 6 EACH | Refills: 3 | Status: SHIPPED | OUTPATIENT
Start: 2024-05-14

## 2024-06-20 ENCOUNTER — TELEPHONE (OUTPATIENT)
Dept: RHEUMATOLOGY | Facility: CLINIC | Age: 69
End: 2024-06-20
Payer: MEDICARE

## 2024-06-20 ENCOUNTER — LAB (OUTPATIENT)
Dept: LAB | Facility: LAB | Age: 69
End: 2024-06-20
Payer: MEDICARE

## 2024-06-20 DIAGNOSIS — R21 RASH: ICD-10-CM

## 2024-06-20 DIAGNOSIS — R21 RASH: Primary | ICD-10-CM

## 2024-06-20 PROCEDURE — 36415 COLL VENOUS BLD VENIPUNCTURE: CPT

## 2024-06-20 PROCEDURE — 86618 LYME DISEASE ANTIBODY: CPT

## 2024-06-20 NOTE — TELEPHONE ENCOUNTER
Patient called stating he had a headache, neck ache and rash in couple of random spots (underarm was predominant and other areas).  His friend a physician in [place] told patient to be checked for tick bite/lyme disease.  Rash lasted 3 days and going away. No appts until Monday.      Patient [phone].     [facility]  [facility] [address] [place] [address] [facility]  [address] [facility] [address]  Phone: [phone] Fax: [phone]

## 2024-06-20 NOTE — TELEPHONE ENCOUNTER
I called patient and left a message on patient's voicemail blood test ordered for patient to have done today at our office and I scheduled patient with Ila for 6-24-24 at 11:00 am.  Please call our office to confirm he received our message.

## 2024-06-22 LAB — B BURGDOR.VLSE1+PEPC10 AB SER IA-ACNC: 0.12 IV

## 2024-06-24 ENCOUNTER — APPOINTMENT (OUTPATIENT)
Dept: PRIMARY CARE | Facility: CLINIC | Age: 69
End: 2024-06-24
Payer: MEDICARE

## 2024-06-24 VITALS
WEIGHT: 242.1 LBS | SYSTOLIC BLOOD PRESSURE: 147 MMHG | HEART RATE: 84 BPM | TEMPERATURE: 97.4 F | BODY MASS INDEX: 34.66 KG/M2 | HEIGHT: 70 IN | DIASTOLIC BLOOD PRESSURE: 65 MMHG | OXYGEN SATURATION: 98 %
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
## 2024-06-24 DIAGNOSIS — R53.83 FATIGUE, UNSPECIFIED TYPE: ICD-10-CM

## 2024-06-24 DIAGNOSIS — R42 DIZZINESS: ICD-10-CM

## 2024-06-24 DIAGNOSIS — I95.1 ORTHOSTATIC HYPOTENSION: Primary | ICD-10-CM

## 2024-06-24 PROCEDURE — 1160F RVW MEDS BY RX/DR IN RCRD: CPT | Performed by: NURSE PRACTITIONER

## 2024-06-24 PROCEDURE — 1159F MED LIST DOCD IN RCRD: CPT | Performed by: NURSE PRACTITIONER

## 2024-06-24 PROCEDURE — 3051F HG A1C>EQUAL 7.0%<8.0%: CPT | Performed by: NURSE PRACTITIONER

## 2024-06-24 PROCEDURE — 3074F SYST BP LT 130 MM HG: CPT | Performed by: NURSE PRACTITIONER

## 2024-06-24 PROCEDURE — 3078F DIAST BP <80 MM HG: CPT | Performed by: NURSE PRACTITIONER

## 2024-06-24 PROCEDURE — 99213 OFFICE O/P EST LOW 20 MIN: CPT | Performed by: NURSE PRACTITIONER

## 2024-06-24 PROCEDURE — 4010F ACE/ARB THERAPY RXD/TAKEN: CPT | Performed by: NURSE PRACTITIONER

## 2024-06-24 RX ORDER — MIDODRINE HYDROCHLORIDE 5 MG/1
5 TABLET ORAL 2 TIMES DAILY
Qty: 60 TABLET | Refills: 1 | Status: SHIPPED | OUTPATIENT
Start: 2024-06-24 | End: 2024-08-23

## 2024-06-24 ASSESSMENT — PATIENT HEALTH QUESTIONNAIRE - PHQ9
SUM OF ALL RESPONSES TO PHQ9 QUESTIONS 1 AND 2: 0
2. FEELING DOWN, DEPRESSED OR HOPELESS: NOT AT ALL
1. LITTLE INTEREST OR PLEASURE IN DOING THINGS: NOT AT ALL

## 2024-06-24 ASSESSMENT — ENCOUNTER SYMPTOMS
FATIGUE: 1
DIZZINESS: 1

## 2024-06-24 NOTE — PROGRESS NOTES
Chief Complaint  Insect Bite (Tick bite) and Dizziness.    History Of Present Illness  Chris Andrade is a 69 y.o. male presents today for evaluation of Insect Bite (Tick bite) and Dizziness.      States he had a tick bite.  Had rash 1 week ago-- very itchy.    States looked like hives in clusters.  Felt very fatigued.  Joint pains. States a physician friend advised him to be evaluated for Lyme disease- this was negative. Hives/ rash has resolved.          Additional acute concern today:   Reports continued dizziness w abrupt change in position.    States that he does stay very well hydrated.  Denies falls.   States w abrupt change in position from ground to standing his vision starts closing in and he feels like he will faint. Denies LOC or falls.      Orthostatics positive today.       Review of Systems  Review of Systems   Constitutional:  Positive for fatigue. Negative for activity change, appetite change, chills and fever.   HENT:  Negative for congestion.    Eyes:  Negative for pain and itching.   Respiratory:  Negative for cough, shortness of breath and wheezing.    Cardiovascular:  Negative for chest pain, palpitations and leg swelling.   Gastrointestinal:  Negative for abdominal distention, abdominal pain, constipation, diarrhea and vomiting.   Genitourinary:  Negative for dysuria, frequency, hematuria and urgency.   Skin:  Negative for rash and wound.   Neurological:  Positive for dizziness. Negative for numbness.   Psychiatric/Behavioral:  The patient is not nervous/anxious.        Past Medical History  He has a past medical history of COVID-19 virus infection (12/08/2023).    Surgical History  He has no past surgical history on file.    Family History  Family History   Problem Relation Name Age of Onset    Other (guillan barre) Mother          s/p influenza vaccine        Social History  He reports that he has quit smoking. His smoking use included cigarettes. He has never used smokeless tobacco. He  "reports that he does not currently use alcohol. He reports that he does not use drugs.    DEPRESSION SCREEN  Over the past 2 weeks, how often have you been bothered by any of the following problems?  Little interest or pleasure in doing things: Not at all  Feeling down, depressed, or hopeless: Not at all      Allergies  Patient has no known allergies.    Medications  Current Outpatient Medications   Medication Instructions    aspirin 81 mg, oral, Daily    atorvastatin (LIPITOR) 10 mg, oral, Daily    blood-glucose sensor (FreeStyle Faith 3 Sensor) device Apply 1 sensor every 2 weeks    cholecalciferol (VITAMIN D-3) 1,000 Units, oral, Daily    HumaLOG KwikPen Insulin 100 unit/mL injection inject 30 units three times a day to cover daily meals    lisinopril 20 mg, oral, Daily    midodrine (PROAMATINE) 5 mg, oral, 2 times daily    multivitamin with minerals tablet 1 tablet, oral, Daily    omega 3-dha-epa-fish oil (Fish OiL) 1,000 mg (120 mg-180 mg) capsule 1,000 mg, oral, 2 times daily    sildenafil (VIAGRA) 100 mg, oral, As needed    Toujeo SoloStar U-300 Insulin 300 unit/mL (1.5 mL) injection Inject 20 Units under the skin once daily at bedtime.    Unifine Pentips 31 gauge x 3/16\" needle Use to inject insulin 4 times daily        Objective   /65   Pulse 84   Temp 36.3 °C (97.4 °F) (Temporal)   Ht 1.778 m (5' 10\")   Wt 110 kg (242 lb 1.6 oz)   SpO2 98%   BMI 34.74 kg/m²    BMI: Estimated body mass index is 34.74 kg/m² as calculated from the following:    Height as of this encounter: 1.778 m (5' 10\").    Weight as of this encounter: 110 kg (242 lb 1.6 oz).      Physical Exam  Physical Exam  Vitals and nursing note reviewed.   Constitutional:       Appearance: Normal appearance.   HENT:      Head: Normocephalic and atraumatic.      Nose: Nose normal.      Mouth/Throat:      Mouth: Mucous membranes are moist.      Pharynx: Oropharynx is clear.   Eyes:      Extraocular Movements: Extraocular movements intact. "      Conjunctiva/sclera: Conjunctivae normal.      Pupils: Pupils are equal, round, and reactive to light.   Cardiovascular:      Rate and Rhythm: Normal rate and regular rhythm.      Pulses: Normal pulses.      Heart sounds: Normal heart sounds.   Pulmonary:      Effort: Pulmonary effort is normal.      Breath sounds: Normal breath sounds.   Abdominal:      General: Bowel sounds are normal.      Palpations: Abdomen is soft.      Tenderness: There is no abdominal tenderness.   Musculoskeletal:         General: Normal range of motion.      Cervical back: Neck supple.   Skin:     General: Skin is warm and dry.   Neurological:      General: No focal deficit present.      Mental Status: He is alert and oriented to person, place, and time. Mental status is at baseline.   Psychiatric:         Mood and Affect: Mood normal.         Behavior: Behavior normal.         Thought Content: Thought content normal.         Judgment: Judgment normal.         Relevant Results and Imaging  Lab on 06/20/2024   Component Date Value Ref Range Status    B. burgdorferi VlsE1/pepC10 Abs, E* 06/20/2024 0.12  <=0.90 IV Final      When Borrelia burgdorferi VlsE1/pepC10 assay is negative further   testing is not recommended and will not be performed.    REFERENCE INTERVAL: B. burgdorferi VlsE1/pepC10 Abs, ETIENNE     0.90 IV or less..........Negative: VlsE1 and pepC10                             antibodies to B. burgdorferi                            not detected.     0.91 - 1.09 IV...........Equivocal: Repeat testing in                            10-14 days may be helpful.     1.10 IV or greater.......Positive: VlsE1 and pepC10                             antibodies to B. burgdorferi                             detected.  Performed By: [facility]  [address] [place] [id number]  : Tim Villeda MD, PhD  CLIA Number: [id number]   Lab on 04/10/2024   Component Date Value Ref Range Status    Vitamin D,  25-Hydroxy, Total 04/10/2024 37  30 - 100 ng/mL Final    Glucose 04/10/2024 179 (H)  74 - 99 mg/dL Final    Sodium 04/10/2024 132 (L)  136 - 145 mmol/L Final    Potassium 04/10/2024 5.1  3.5 - 5.3 mmol/L Final    Chloride 04/10/2024 100  98 - 107 mmol/L Final    Bicarbonate 04/10/2024 24  21 - 32 mmol/L Final    Anion Gap 04/10/2024 13  10 - 20 mmol/L Final    Urea Nitrogen 04/10/2024 19  6 - 23 mg/dL Final    Creatinine 04/10/2024 1.07  0.50 - 1.30 mg/dL Final    eGFR 04/10/2024 77  >60 mL/min/1.73m*2 Final    Calculations of estimated GFR are performed using the 2021 CKD-EPI Study Refit equation without the race variable for the IDMS-Traceable creatinine methods.  https://jasn.asnjournals.org/content/early/2021/09/22/ASN.9693376288    Calcium 04/10/2024 9.1  8.6 - 10.6 mg/dL Final    Hemoglobin A1C 04/10/2024 7.5 (H)  see below % Final    Estimated Average Glucose 04/10/2024 169  Not Established mg/dL Final   Lab on 12/08/2023   Component Date Value Ref Range Status    Total Protein, Urine Random 12/08/2023 42 (H)  5 - 25 mg/dL Final    Creatinine, Urine Random 12/08/2023 59.8  20.0 - 370.0 mg/dL Final    T. Protein/Creatinine Ratio 12/08/2023 0.70 (H)  0.00 - 0.17 mg/mg Creat Final   Lab on 12/01/2023   Component Date Value Ref Range Status    Glucose 12/01/2023 195 (H)  74 - 99 mg/dL Final    Sodium 12/01/2023 132 (L)  136 - 145 mmol/L Final    Potassium 12/01/2023 4.9  3.5 - 5.3 mmol/L Final    Chloride 12/01/2023 100  98 - 107 mmol/L Final    Bicarbonate 12/01/2023 23  21 - 32 mmol/L Final    Anion Gap 12/01/2023 14  10 - 20 mmol/L Final    Urea Nitrogen 12/01/2023 23  6 - 23 mg/dL Final    Creatinine 12/01/2023 1.13  0.50 - 1.30 mg/dL Final    eGFR 12/01/2023 72  >60 mL/min/1.73m*2 Final    Calculations of estimated GFR are performed using the 2021 CKD-EPI Study Refit equation without the race variable for the IDMS-Traceable creatinine  methods.  https://jasn.asnjournals.org/content/early/2021/09/22/ASN.8250128634    Calcium 12/01/2023 9.0  8.6 - 10.6 mg/dL Final    Albumin 12/01/2023 4.0  3.4 - 5.0 g/dL Final    Alkaline Phosphatase 12/01/2023 67  33 - 136 U/L Final    Total Protein 12/01/2023 6.2 (L)  6.4 - 8.2 g/dL Final    AST 12/01/2023 16  9 - 39 U/L Final    Bilirubin, Total 12/01/2023 0.5  0.0 - 1.2 mg/dL Final    ALT 12/01/2023 19  10 - 52 U/L Final    Patients treated with Sulfasalazine may generate falsely decreased results for ALT.    Hemoglobin A1C 12/01/2023 7.7 (H)  see below % Final    Estimated Average Glucose 12/01/2023 174  Not Established mg/dL Final    Cholesterol 12/01/2023 160  0 - 199 mg/dL Final          Age      Desirable   Borderline High   High     0-19 Y     0 - 169       170 - 199     >/= 200    20-24 Y     0 - 189       190 - 224     >/= 225         >24 Y     0 - 199       200 - 239     >/= 240   **All ranges are based on fasting samples. Specific   therapeutic targets will vary based on patient-specific   cardiac risk.    Pediatric guidelines reference:Pediatrics 2011, 128(S5).Adult guidelines reference: NCEP ATPIII Guidelines,KALIN 2001, 258:2486-97    Venipuncture immediately after or during the administration of Metamizole may lead to falsely low results. Testing should be performed immediately prior to Metamizole dosing.    HDL-Cholesterol 12/01/2023 52.0  mg/dL Final      Age       Very Low   Low     Normal    High    0-19 Y    < 35      < 40     40-45     ----  20-24 Y    ----     < 40      >45      ----        >24 Y      ----     < 40     40-60      >60      Cholesterol/HDL Ratio 12/01/2023 3.1   Final      Ref Values  Desirable  < 3.4  High Risk  > 5.0    LDL Calculated 12/01/2023 95  <=99 mg/dL Final                                Near   Borderline      AGE      Desirable  Optimal    High     High     Very High     0-19 Y     0 - 109     ---    110-129   >/= 130     ----    20-24 Y     0 - 119     ---     120-159   >/= 160     ----      >24 Y     0 -  99   100-129  130-159   160-189     >/=190      VLDL 12/01/2023 13  0 - 40 mg/dL Final    Triglycerides 12/01/2023 67  0 - 149 mg/dL Final       Age         Desirable   Borderline High   High     Very High   0 D-90 D    19 - 174         ----         ----        ----  91 D- 9 Y     0 -  74        75 -  99     >/= 100      ----    10-19 Y     0 -  89        90 - 129     >/= 130      ----    20-24 Y     0 - 114       115 - 149     >/= 150      ----         >24 Y     0 - 149       150 - 199    200- 499    >/= 500    Venipuncture immediately after or during the administration of Metamizole may lead to falsely low results. Testing should be performed immediately prior to Metamizole dosing.    Non HDL Cholesterol 12/01/2023 108  0 - 149 mg/dL Final          Age       Desirable   Borderline High   High     Very High     0-19 Y     0 - 119       120 - 144     >/= 145    >/= 160    20-24 Y     0 - 149       150 - 189     >/= 190      ----         >24 Y    30 mg/dL above LDL Cholesterol goal       No images are attached to the encounter.        Assessment and Plan  Assessment/Plan   Problem List Items Addressed This Visit             ICD-10-CM    Dizziness R42    Relevant Orders    CBC and Auto Differential    Fatigue R53.83    Relevant Orders    CBC and Auto Differential     Other Visit Diagnoses         Codes    Orthostatic hypotension    -  Primary I95.1    Relevant Medications    midodrine (Proamatine) 5 mg tablet    Other Relevant Orders    Referral to Neurology    Insect bite, unspecified site, initial encounter     W57.XXXA    Lyme titer- negative

## 2024-06-26 ASSESSMENT — ENCOUNTER SYMPTOMS
VOMITING: 0
NUMBNESS: 0
ABDOMINAL PAIN: 0
APPETITE CHANGE: 0
EYE ITCHING: 0
FREQUENCY: 0
DIARRHEA: 0
ABDOMINAL DISTENTION: 0
CONSTIPATION: 0
DYSURIA: 0
COUGH: 0
WOUND: 0
SHORTNESS OF BREATH: 0
FEVER: 0
EYE PAIN: 0
HEMATURIA: 0
NERVOUS/ANXIOUS: 0
ACTIVITY CHANGE: 0
CHILLS: 0
PALPITATIONS: 0
WHEEZING: 0

## 2024-07-29 ENCOUNTER — APPOINTMENT (OUTPATIENT)
Dept: PRIMARY CARE | Facility: CLINIC | Age: 69
End: 2024-07-29
Payer: MEDICARE

## 2024-09-03 ENCOUNTER — APPOINTMENT (OUTPATIENT)
Dept: PRIMARY CARE | Facility: CLINIC | Age: 69
End: 2024-09-03
Payer: MEDICARE

## 2024-09-03 VITALS
HEART RATE: 74 BPM | TEMPERATURE: 97.2 F | SYSTOLIC BLOOD PRESSURE: 160 MMHG | OXYGEN SATURATION: 98 % | HEIGHT: 72 IN | WEIGHT: 240.9 LBS | DIASTOLIC BLOOD PRESSURE: 84 MMHG | BODY MASS INDEX: 32.63 KG/M2

## 2024-09-03 DIAGNOSIS — Z12.11 COLON CANCER SCREENING: ICD-10-CM

## 2024-09-03 DIAGNOSIS — N52.9 ERECTILE DYSFUNCTION, UNSPECIFIED ERECTILE DYSFUNCTION TYPE: ICD-10-CM

## 2024-09-03 DIAGNOSIS — Z78.9 FULL CODE STATUS: ICD-10-CM

## 2024-09-03 DIAGNOSIS — E11.3499: ICD-10-CM

## 2024-09-03 DIAGNOSIS — Z79.4 TYPE 2 DIABETES MELLITUS WITH RETINOPATHY, WITH LONG-TERM CURRENT USE OF INSULIN, MACULAR EDEMA PRESENCE UNSPECIFIED, UNSPECIFIED LATERALITY, UNSPECIFIED RETINOPATHY SEVERITY (MULTI): ICD-10-CM

## 2024-09-03 DIAGNOSIS — E11.319 TYPE 2 DIABETES MELLITUS WITH RETINOPATHY, WITH LONG-TERM CURRENT USE OF INSULIN, MACULAR EDEMA PRESENCE UNSPECIFIED, UNSPECIFIED LATERALITY, UNSPECIFIED RETINOPATHY SEVERITY (MULTI): ICD-10-CM

## 2024-09-03 DIAGNOSIS — E78.00 PURE HYPERCHOLESTEROLEMIA: ICD-10-CM

## 2024-09-03 DIAGNOSIS — Z79.4: ICD-10-CM

## 2024-09-03 DIAGNOSIS — I10 HYPERTENSION, UNSPECIFIED TYPE: Primary | ICD-10-CM

## 2024-09-03 PROBLEM — R42 DIZZINESS: Status: RESOLVED | Noted: 2023-12-08 | Resolved: 2024-09-03

## 2024-09-03 PROBLEM — R53.83 FATIGUE: Status: RESOLVED | Noted: 2023-12-08 | Resolved: 2024-09-03

## 2024-09-03 PROCEDURE — G0439 PPPS, SUBSEQ VISIT: HCPCS | Performed by: NURSE PRACTITIONER

## 2024-09-03 PROCEDURE — 3008F BODY MASS INDEX DOCD: CPT | Performed by: NURSE PRACTITIONER

## 2024-09-03 PROCEDURE — 99214 OFFICE O/P EST MOD 30 MIN: CPT | Performed by: NURSE PRACTITIONER

## 2024-09-03 PROCEDURE — 1160F RVW MEDS BY RX/DR IN RCRD: CPT | Performed by: NURSE PRACTITIONER

## 2024-09-03 PROCEDURE — 1158F ADVNC CARE PLAN TLK DOCD: CPT | Performed by: NURSE PRACTITIONER

## 2024-09-03 PROCEDURE — 1036F TOBACCO NON-USER: CPT | Performed by: NURSE PRACTITIONER

## 2024-09-03 PROCEDURE — 1170F FXNL STATUS ASSESSED: CPT | Performed by: NURSE PRACTITIONER

## 2024-09-03 PROCEDURE — 1159F MED LIST DOCD IN RCRD: CPT | Performed by: NURSE PRACTITIONER

## 2024-09-03 PROCEDURE — 3079F DIAST BP 80-89 MM HG: CPT | Performed by: NURSE PRACTITIONER

## 2024-09-03 PROCEDURE — 3077F SYST BP >= 140 MM HG: CPT | Performed by: NURSE PRACTITIONER

## 2024-09-03 PROCEDURE — 3051F HG A1C>EQUAL 7.0%<8.0%: CPT | Performed by: NURSE PRACTITIONER

## 2024-09-03 PROCEDURE — 1123F ACP DISCUSS/DSCN MKR DOCD: CPT | Performed by: NURSE PRACTITIONER

## 2024-09-03 RX ORDER — INSULIN LISPRO 100 [IU]/ML
INJECTION, SOLUTION INTRAVENOUS; SUBCUTANEOUS
Qty: 6 EACH | Refills: 3 | Status: SHIPPED | OUTPATIENT
Start: 2024-09-03

## 2024-09-03 RX ORDER — INSULIN LISPRO 100 [IU]/ML
INJECTION, SOLUTION INTRAVENOUS; SUBCUTANEOUS
Qty: 30 EACH | Refills: 3 | Status: SHIPPED | OUTPATIENT
Start: 2024-09-03 | End: 2024-09-03

## 2024-09-03 RX ORDER — SILDENAFIL 100 MG/1
100 TABLET, FILM COATED ORAL AS NEEDED
Qty: 30 TABLET | Refills: 0 | Status: SHIPPED | OUTPATIENT
Start: 2024-09-03

## 2024-09-03 RX ORDER — LISINOPRIL AND HYDROCHLOROTHIAZIDE 10; 12.5 MG/1; MG/1
1 TABLET ORAL DAILY
Qty: 90 TABLET | Refills: 1 | Status: SHIPPED | OUTPATIENT
Start: 2024-09-03 | End: 2025-03-02

## 2024-09-03 RX ORDER — ATORVASTATIN CALCIUM 10 MG/1
10 TABLET, FILM COATED ORAL DAILY
Qty: 90 TABLET | Refills: 3 | Status: SHIPPED | OUTPATIENT
Start: 2024-09-03 | End: 2025-09-03

## 2024-09-03 ASSESSMENT — ENCOUNTER SYMPTOMS
WHEEZING: 0
DYSURIA: 0
EYE ITCHING: 0
PALPITATIONS: 0
FREQUENCY: 0
DIARRHEA: 0
HEMATURIA: 0
WOUND: 0
NUMBNESS: 0
ABDOMINAL PAIN: 0
CHILLS: 0
FATIGUE: 1
NERVOUS/ANXIOUS: 0
SHORTNESS OF BREATH: 0
ABDOMINAL DISTENTION: 0
ACTIVITY CHANGE: 0
CONSTIPATION: 0
VOMITING: 0
EYE PAIN: 0
APPETITE CHANGE: 0
FEVER: 0
COUGH: 0

## 2024-09-03 ASSESSMENT — PATIENT HEALTH QUESTIONNAIRE - PHQ9
1. LITTLE INTEREST OR PLEASURE IN DOING THINGS: NOT AT ALL
SUM OF ALL RESPONSES TO PHQ9 QUESTIONS 1 AND 2: 0
2. FEELING DOWN, DEPRESSED OR HOPELESS: NOT AT ALL

## 2024-09-03 ASSESSMENT — ACTIVITIES OF DAILY LIVING (ADL)
BATHING: INDEPENDENT
DRESSING: INDEPENDENT
MANAGING_FINANCES: INDEPENDENT
DOING_HOUSEWORK: INDEPENDENT
TAKING_MEDICATION: INDEPENDENT
GROCERY_SHOPPING: INDEPENDENT

## 2024-09-03 NOTE — PROGRESS NOTES
Chris Andrade male   1955 69 y.o.   [id number]    Chief Complaint  Medicare Annual Wellness Visit Subsequent.    History Of Present Illness  Chris Andrade is a 69 y.o. male presents today for Medicare wellness visit     Acute concerns today: none     Chronic conditions reviewed:   HTN   Has self decreased lisinopril to 10 mg daily as he thought his BP at home was too low and because he was getting dizzy with position changes. States that since the decrease in medication he has no longer experienced the dizziness.    Has been monitoring blood pressures at home-- states most of the time it is normal (-140) but at times has also been in the 150s.  Reports that on the days his BP has been high he would take an additional 10 mg of lisinopril.   Reviewed with patient that this is not a recommended approach to managing blood pressure and that adjustment in plan is needed.     HLD   Cont on atorvastatin daily.  Takes it midday. Cholesterol is very well controlled.       DM   Reports that he frequently skips toujeo if sugar is low at night.   Does take premeal humalog 5-6 units if no carbs in meal and 10 units if meal w carbs   Continues to monitor BS via CGM. Reports occasional hypoglycemia especially in middle of night-- did not bring CGM monitor for review of trends today.   Cont to follow w ophthalmology   Does not follow w podiatrist - denies open sores/wounds    ED  States would like refill sent today           Review of Systems  Review of Systems   Constitutional:  Positive for fatigue. Negative for activity change, appetite change, chills and fever.   HENT:  Negative for congestion.    Eyes:  Negative for pain and itching.   Respiratory:  Negative for cough, shortness of breath and wheezing.    Cardiovascular:  Negative for chest pain, palpitations and leg swelling.   Gastrointestinal:  Negative for abdominal distention, abdominal pain, constipation, diarrhea and vomiting.   Genitourinary:  Negative for  dysuria, frequency, hematuria and urgency.   Skin:  Negative for rash and wound.   Neurological:  Negative for numbness.   Psychiatric/Behavioral:  The patient is not nervous/anxious.         Screenings:  Colonoscopy- due- referral ordered  -pt requests [facility] disease [facility] referral   Immunizations:   Flu- declines today   Tdap- recommended at pharmacy   Pneum- declines today   Shingles- recommended at pharmacy     Past Medical History  He has a past medical history of COVID-19 virus infection (12/08/2023).    Surgical History  He has no past surgical history on file.    Family History  Family History   Problem Relation Name Age of Onset    Other (guillan barre) Mother          s/p influenza vaccine        Social History  He reports that he has quit smoking. His smoking use included cigarettes. He has never used smokeless tobacco. He reports that he does not currently use alcohol. He reports that he does not use drugs.    DEPRESSION SCREEN  Over the past 2 weeks, how often have you been bothered by any of the following problems?  Little interest or pleasure in doing things: Not at all  Feeling down, depressed, or hopeless: Not at all    Allergies  Patient has no known allergies.    Medications  Current Outpatient Medications   Medication Instructions    aspirin 81 mg, oral, Daily    atorvastatin (LIPITOR) 10 mg, oral, Daily    blood-glucose sensor (FreeStyle Faith 3 Sensor) device Apply 1 sensor every 2 weeks    cholecalciferol (VITAMIN D-3) 1,000 Units, oral, Daily    HumaLOG KwikPen Insulin 100 unit/mL injection inject 6 units three times a day to cover daily meals    lisinopriL-hydrochlorothiazide 10-12.5 mg tablet 1 tablet, oral, Daily    multivitamin with minerals tablet 1 tablet, oral, Daily    omega 3-dha-epa-fish oil (Fish OiL) 1,000 mg (120 mg-180 mg) capsule 1,000 mg, oral, 2 times daily    sildenafil (VIAGRA) 100 mg, oral, As needed    Toujeo SoloStar U-300 Insulin 300 unit/mL (1.5 mL) injection  "Inject 10 Units under the skin once daily at bedtime.    Unifine Pentips 31 gauge x 3/16\" needle Use to inject insulin 4 times daily        Objective   /84 (BP Location: Left arm, Patient Position: Sitting, BP Cuff Size: Adult)   Pulse 74   Temp 36.2 °C (97.2 °F) (Temporal)   Ht 1.829 m (6')   Wt 109 kg (240 lb 14.4 oz)   SpO2 98%   BMI 32.67 kg/m²    BMI: Estimated body mass index is 32.67 kg/m² as calculated from the following:    Height as of this encounter: 1.829 m (6').    Weight as of this encounter: 109 kg (240 lb 14.4 oz).      Physical Exam  Physical Exam  Vitals and nursing note reviewed.   Constitutional:       Appearance: Normal appearance.   HENT:      Head: Normocephalic and atraumatic.      Nose: Nose normal.      Mouth/Throat:      Mouth: Mucous membranes are moist.      Pharynx: Oropharynx is clear.   Eyes:      Extraocular Movements: Extraocular movements intact.      Conjunctiva/sclera: Conjunctivae normal.      Pupils: Pupils are equal, round, and reactive to light.   Cardiovascular:      Rate and Rhythm: Normal rate and regular rhythm.      Pulses: Normal pulses.      Heart sounds: Normal heart sounds.   Pulmonary:      Effort: Pulmonary effort is normal.      Breath sounds: Normal breath sounds.   Abdominal:      General: Bowel sounds are normal.      Palpations: Abdomen is soft.      Tenderness: There is no abdominal tenderness.   Musculoskeletal:         General: Normal range of motion.      Cervical back: Neck supple.   Skin:     General: Skin is warm and dry.   Neurological:      General: No focal deficit present.      Mental Status: He is alert and oriented to person, place, and time. Mental status is at baseline.   Psychiatric:         Mood and Affect: Mood normal.         Behavior: Behavior normal.         Thought Content: Thought content normal.         Judgment: Judgment normal.         Relevant Results and Imaging  Lab on 06/20/2024   Component Date Value Ref Range Status "    B. burgdorferi VlsE1/pepC10 Abs, E* 06/20/2024 0.12  <=0.90 IV Final      When Borrelia burgdorferi VlsE1/pepC10 assay is negative further   testing is not recommended and will not be performed.    REFERENCE INTERVAL: B. burgdorferi VlsE1/pepC10 Abs, ETIENNE     0.90 IV or less..........Negative: VlsE1 and pepC10                             antibodies to B. burgdorferi                            not detected.     0.91 - 1.09 IV...........Equivocal: Repeat testing in                            10-14 days may be helpful.     1.10 IV or greater.......Positive: VlsE1 and pepC10                             antibodies to B. burgdorferi                             detected.  Performed By: [facility]  [address] [place] [address]  : Tim Villeda MD, PhD  CLIA Number: [id number]   Lab on 04/10/2024   Component Date Value Ref Range Status    Vitamin D, 25-Hydroxy, Total 04/10/2024 37  30 - 100 ng/mL Final    Glucose 04/10/2024 179 (H)  74 - 99 mg/dL Final    Sodium 04/10/2024 132 (L)  136 - 145 mmol/L Final    Potassium 04/10/2024 5.1  3.5 - 5.3 mmol/L Final    Chloride 04/10/2024 100  98 - 107 mmol/L Final    Bicarbonate 04/10/2024 24  21 - 32 mmol/L Final    Anion Gap 04/10/2024 13  10 - 20 mmol/L Final    Urea Nitrogen 04/10/2024 19  6 - 23 mg/dL Final    Creatinine 04/10/2024 1.07  0.50 - 1.30 mg/dL Final    eGFR 04/10/2024 77  >60 mL/min/1.73m*2 Final    Calculations of estimated GFR are performed using the 2021 CKD-EPI Study Refit equation without the race variable for the IDMS-Traceable creatinine methods.  https://jasn.asnjournals.org/content/early/2021/09/22/ASN.6903819673    Calcium 04/10/2024 9.1  8.6 - 10.6 mg/dL Final    Hemoglobin A1C 04/10/2024 7.5 (H)  see below % Final    Estimated Average Glucose 04/10/2024 169  Not Established mg/dL Final   Lab on 12/08/2023   Component Date Value Ref Range Status    Total Protein, Urine Random 12/08/2023 42 (H)  5 - 25 mg/dL  Final    Creatinine, Urine Random 12/08/2023 59.8  20.0 - 370.0 mg/dL Final    T. Protein/Creatinine Ratio 12/08/2023 0.70 (H)  0.00 - 0.17 mg/mg Creat Final   Lab on 12/01/2023   Component Date Value Ref Range Status    Glucose 12/01/2023 195 (H)  74 - 99 mg/dL Final    Sodium 12/01/2023 132 (L)  136 - 145 mmol/L Final    Potassium 12/01/2023 4.9  3.5 - 5.3 mmol/L Final    Chloride 12/01/2023 100  98 - 107 mmol/L Final    Bicarbonate 12/01/2023 23  21 - 32 mmol/L Final    Anion Gap 12/01/2023 14  10 - 20 mmol/L Final    Urea Nitrogen 12/01/2023 23  6 - 23 mg/dL Final    Creatinine 12/01/2023 1.13  0.50 - 1.30 mg/dL Final    eGFR 12/01/2023 72  >60 mL/min/1.73m*2 Final    Calculations of estimated GFR are performed using the 2021 CKD-EPI Study Refit equation without the race variable for the IDMS-Traceable creatinine methods.  https://jasn.asnjournals.org/content/early/2021/09/22/ASN.8922345371    Calcium 12/01/2023 9.0  8.6 - 10.6 mg/dL Final    Albumin 12/01/2023 4.0  3.4 - 5.0 g/dL Final    Alkaline Phosphatase 12/01/2023 67  33 - 136 U/L Final    Total Protein 12/01/2023 6.2 (L)  6.4 - 8.2 g/dL Final    AST 12/01/2023 16  9 - 39 U/L Final    Bilirubin, Total 12/01/2023 0.5  0.0 - 1.2 mg/dL Final    ALT 12/01/2023 19  10 - 52 U/L Final    Patients treated with Sulfasalazine may generate falsely decreased results for ALT.    Hemoglobin A1C 12/01/2023 7.7 (H)  see below % Final    Estimated Average Glucose 12/01/2023 174  Not Established mg/dL Final    Cholesterol 12/01/2023 160  0 - 199 mg/dL Final          Age      Desirable   Borderline High   High     0-19 Y     0 - 169       170 - 199     >/= 200    20-24 Y     0 - 189       190 - 224     >/= 225         >24 Y     0 - 199       200 - 239     >/= 240   **All ranges are based on fasting samples. Specific   therapeutic targets will vary based on patient-specific   cardiac risk.    Pediatric guidelines reference:Pediatrics 2011, 128(S5).Adult guidelines reference:  NCEP ATPIII Guidelines,KALIN 2001, 258:2486-97    Venipuncture immediately after or during the administration of Metamizole may lead to falsely low results. Testing should be performed immediately prior to Metamizole dosing.    HDL-Cholesterol 12/01/2023 52.0  mg/dL Final      Age       Very Low   Low     Normal    High    0-19 Y    < 35      < 40     40-45     ----  20-24 Y    ----     < 40      >45      ----        >24 Y      ----     < 40     40-60      >60      Cholesterol/HDL Ratio 12/01/2023 3.1   Final      Ref Values  Desirable  < 3.4  High Risk  > 5.0    LDL Calculated 12/01/2023 95  <=99 mg/dL Final                                Near   Borderline      AGE      Desirable  Optimal    High     High     Very High     0-19 Y     0 - 109     ---    110-129   >/= 130     ----    20-24 Y     0 - 119     ---    120-159   >/= 160     ----      >24 Y     0 -  99   100-129  130-159   160-189     >/=190      VLDL 12/01/2023 13  0 - 40 mg/dL Final    Triglycerides 12/01/2023 67  0 - 149 mg/dL Final       Age         Desirable   Borderline High   High     Very High   0 D-90 D    19 - 174         ----         ----        ----  91 D- 9 Y     0 -  74        75 -  99     >/= 100      ----    10-19 Y     0 -  89        90 - 129     >/= 130      ----    20-24 Y     0 - 114       115 - 149     >/= 150      ----         >24 Y     0 - 149       150 - 199    200- 499    >/= 500    Venipuncture immediately after or during the administration of Metamizole may lead to falsely low results. Testing should be performed immediately prior to Metamizole dosing.    Non HDL Cholesterol 12/01/2023 108  0 - 149 mg/dL Final          Age       Desirable   Borderline High   High     Very High     0-19 Y     0 - 119       120 - 144     >/= 145    >/= 160    20-24 Y     0 - 149       150 - 189     >/= 190      ----         >24 Y    30 mg/dL above LDL Cholesterol goal       No images are attached to the encounter.        Assessment and  Plan  Assessment/Plan   Problem List Items Addressed This Visit             ICD-10-CM    Erectile dysfunction N52.9    Relevant Medications    sildenafil (Viagra) 100 mg tablet    Hypertension - Primary I10     -chronic, uncontrolled   -8/24/23 CT Cardiac score 299  -follows w Cards Dr Jean   -cont ASA  -in 4/2024 pt self decreased lisinopril from 20 mg to 10 mg daily due to dizziness with position changes. Reports that he  would give up to 20 mg daily if BP reading was elevated on that day.   9/3/24 Plan:  Start lisinopril/hydrochlorothiazide 10/12.5, monitor bp at home and records on paper.  Bring this record to next follow up   -advised pt to consult w provider prior to making medication changes in future.          Relevant Medications    lisinopriL-hydrochlorothiazide 10-12.5 mg tablet    Other Relevant Orders    Basic metabolic panel    Pure hypercholesterolemia E78.00     -chronic, stable   -lipid panel 12/1/23- wnl   -cont atorvastatin 10 mg at this time (did not tolerate increase to 20 mg as advised by cardiologist Dr Jean due to elevated CT Cardiac score (299) --due to muscle aches)         Type 2 diabetes mellitus with retinopathy, with long-term current use of insulin (Multi) E11.319, Z79.4     -did not tolerate metformin in past ([facility])   -Last hemoglobin A1c 4/12/24 was 7.5  -Monitors blood sugars with freestyle jose continuous glucose monitor.  Does experience hypoglycemia frequently-multiple times per week blood sugar in the 60s.  At these times feels shaky, diaphoretic.  States he takes orange juice or hot water with honey and this usually improves his blood sugars.  -noncompliant w plan and makes adjustments as he sees fit.    Education provided about monitoring fasting and premeal insulins, dosing humalog only for premeal BS values, NOT giving additional humalog for postmeal BS spikes. Reviewed role of long acting insulin such as Toujeo.   Current plan: Toujeo 10 units at bedtime(states does  not need refill at this time) .  Humalog 6 units premeal.   Monitor BS and follow up w Dr Isabel in 3 months, sooner if BS are too high or too low.   -Continue to follow-up with [facility]-had steroid injection in [facility] eye- does not note any improvement               Relevant Medications    atorvastatin (Lipitor) 10 mg tablet    HumaLOG KwikPen Insulin 100 unit/mL injection    Other Relevant Orders    Hemoglobin A1C    Basic metabolic panel     Other Visit Diagnoses         Codes    Colon cancer screening     Z12.11    Relevant Orders    Referral to Gastroenterology    Full code status     Z78.9    Relevant Orders    Full code (Completed)

## 2024-09-03 NOTE — ASSESSMENT & PLAN NOTE
-chronic, uncontrolled   -8/24/23 CT Cardiac score 299  -follows w Cards Dr Jean   -cont ASA  -in 4/2024 pt self decreased lisinopril from 20 mg to 10 mg daily due to dizziness with position changes. Reports that he  would give up to 20 mg daily if BP reading was elevated on that day.   9/3/24 Plan:  Start lisinopril/hydrochlorothiazide 10/12.5, monitor bp at home and records on paper.  Bring this record to next follow up   -advised pt to consult w provider prior to making medication changes in future.

## 2024-09-04 NOTE — ASSESSMENT & PLAN NOTE
-did not tolerate metformin in past ([facility])   -Last hemoglobin A1c 4/12/24 was 7.5  -Monitors blood sugars with freestyle jose continuous glucose monitor.  Does experience hypoglycemia frequently-multiple times per week blood sugar in the 60s.  At these times feels shaky, diaphoretic.  States he takes orange juice or hot water with honey and this usually improves his blood sugars.  -noncompliant w plan and makes adjustments as he sees fit.    Education provided about monitoring fasting and premeal insulins, dosing humalog only for premeal BS values, NOT giving additional humalog for postmeal BS spikes. Reviewed role of long acting insulin such as Toujeo.   Current plan: Toujeo 10 units at bedtime(states does not need refill at this time) .  Humalog 6 units premeal.   Monitor BS and follow up w Dr Isabel in 3 months, sooner if BS are too high or too low.   -Continue to follow-up with ophthalmology [facility]-had steroid injection in [facility] eye- does not note any improvement

## 2024-10-28 ENCOUNTER — TELEPHONE (OUTPATIENT)
Dept: RHEUMATOLOGY | Facility: CLINIC | Age: 69
End: 2024-10-28
Payer: MEDICARE

## 2025-01-06 ENCOUNTER — APPOINTMENT (OUTPATIENT)
Dept: PRIMARY CARE | Facility: CLINIC | Age: 70
End: 2025-01-06
Payer: MEDICARE

## 2025-01-06 VITALS
TEMPERATURE: 96.6 F | BODY MASS INDEX: 33.97 KG/M2 | WEIGHT: 250.8 LBS | OXYGEN SATURATION: 98 % | HEIGHT: 72 IN | DIASTOLIC BLOOD PRESSURE: 82 MMHG | HEART RATE: 85 BPM | SYSTOLIC BLOOD PRESSURE: 148 MMHG

## 2025-01-06 DIAGNOSIS — E11.3413 TYPE 2 DIABETES MELLITUS WITH BOTH EYES AFFECTED BY SEVERE NONPROLIFERATIVE RETINOPATHY AND MACULAR EDEMA, WITH LONG-TERM CURRENT USE OF INSULIN: Primary | ICD-10-CM

## 2025-01-06 DIAGNOSIS — I10 BENIGN ESSENTIAL HYPERTENSION: ICD-10-CM

## 2025-01-06 DIAGNOSIS — Z79.4 TYPE 2 DIABETES MELLITUS WITH BOTH EYES AFFECTED BY SEVERE NONPROLIFERATIVE RETINOPATHY AND MACULAR EDEMA, WITH LONG-TERM CURRENT USE OF INSULIN: Primary | ICD-10-CM

## 2025-01-06 DIAGNOSIS — Z79.4 TYPE 2 DIABETES MELLITUS WITH SEVERE NONPROLIFERATIVE RETINOPATHY, WITH LONG-TERM CURRENT USE OF INSULIN, MACULAR EDEMA PRESENCE UNSPECIFIED, UNSPECIFIED LATERALITY: ICD-10-CM

## 2025-01-06 DIAGNOSIS — E78.00 PURE HYPERCHOLESTEROLEMIA: ICD-10-CM

## 2025-01-06 DIAGNOSIS — E11.42 DIABETIC POLYNEUROPATHY ASSOCIATED WITH TYPE 2 DIABETES MELLITUS (MULTI): ICD-10-CM

## 2025-01-06 DIAGNOSIS — E11.3499 TYPE 2 DIABETES MELLITUS WITH SEVERE NONPROLIFERATIVE RETINOPATHY, WITH LONG-TERM CURRENT USE OF INSULIN, MACULAR EDEMA PRESENCE UNSPECIFIED, UNSPECIFIED LATERALITY: ICD-10-CM

## 2025-01-06 PROBLEM — R93.1 ELEVATED CORONARY ARTERY CALCIUM SCORE: Status: ACTIVE | Noted: 2025-01-06

## 2025-01-06 PROCEDURE — 1124F ACP DISCUSS-NO DSCNMKR DOCD: CPT | Performed by: INTERNAL MEDICINE

## 2025-01-06 PROCEDURE — 1036F TOBACCO NON-USER: CPT | Performed by: INTERNAL MEDICINE

## 2025-01-06 PROCEDURE — 4010F ACE/ARB THERAPY RXD/TAKEN: CPT | Performed by: INTERNAL MEDICINE

## 2025-01-06 PROCEDURE — 3079F DIAST BP 80-89 MM HG: CPT | Performed by: INTERNAL MEDICINE

## 2025-01-06 PROCEDURE — 99214 OFFICE O/P EST MOD 30 MIN: CPT | Performed by: INTERNAL MEDICINE

## 2025-01-06 PROCEDURE — 1159F MED LIST DOCD IN RCRD: CPT | Performed by: INTERNAL MEDICINE

## 2025-01-06 PROCEDURE — 3077F SYST BP >= 140 MM HG: CPT | Performed by: INTERNAL MEDICINE

## 2025-01-06 PROCEDURE — 3008F BODY MASS INDEX DOCD: CPT | Performed by: INTERNAL MEDICINE

## 2025-01-06 PROCEDURE — 1160F RVW MEDS BY RX/DR IN RCRD: CPT | Performed by: INTERNAL MEDICINE

## 2025-01-06 RX ORDER — LISINOPRIL 10 MG/1
10 TABLET ORAL 2 TIMES DAILY
Qty: 180 TABLET | Refills: 3 | Status: SHIPPED | OUTPATIENT
Start: 2025-01-06 | End: 2026-01-06

## 2025-01-06 ASSESSMENT — ENCOUNTER SYMPTOMS
WHEEZING: 0
WOUND: 0
NERVOUS/ANXIOUS: 0
DIARRHEA: 0
FEVER: 0
COUGH: 0
NAUSEA: 0
DYSURIA: 0
HEMATURIA: 0
ABDOMINAL PAIN: 0
CONSTIPATION: 0
FREQUENCY: 0
VOMITING: 0
PALPITATIONS: 0
SHORTNESS OF BREATH: 0
CHILLS: 0

## 2025-01-06 ASSESSMENT — PATIENT HEALTH QUESTIONNAIRE - PHQ9
1. LITTLE INTEREST OR PLEASURE IN DOING THINGS: NOT AT ALL
2. FEELING DOWN, DEPRESSED OR HOPELESS: NOT AT ALL
SUM OF ALL RESPONSES TO PHQ9 QUESTIONS 1 AND 2: 0

## 2025-01-06 NOTE — PROGRESS NOTES
CHIEF COMPLAINT  Establish Care and Hypertension    HISTORY OF PRESENT ILLNESS  Chris Andrade is a 69 y.o. male presents today for follow up of Establish Care and Hypertension    HPI    New patient to me for primary care.  History reviewed and updated.  Ila Nath had been working with him to get his BP under control.  Was having orthostasis on a previous regimen.  He had been monitoring at home and BP was high, so he restarted some lisinopril from a previous Rx.  Had 20 mg tabs, was taking 1/2 tab BID.  This seemed to be working well, however he did run out, and now BP is elevated again.  Has some hyperglycemia in the afternoon, which he feels is typical for him.  He has been trying hard to follow diabetic diet.     REVIEW OF SYSTEMS  Review of Systems   Constitutional:  Negative for chills and fever.   HENT:  Negative for congestion.    Respiratory:  Negative for cough, shortness of breath and wheezing.    Cardiovascular:  Negative for chest pain, palpitations and leg swelling.   Gastrointestinal:  Negative for abdominal pain, constipation, diarrhea, nausea and vomiting.   Genitourinary:  Negative for dysuria, frequency, hematuria and urgency.   Skin:  Negative for rash and wound.   Psychiatric/Behavioral:  The patient is not nervous/anxious.        ALLERGIES  Ragweed pollen    MEDICATIONS  Current Outpatient Medications   Medication Instructions    aspirin 81 mg, Daily    atorvastatin (LIPITOR) 10 mg, oral, Daily    blood-glucose sensor (FreeStyle Faith 3 Sensor) device Apply 1 sensor every 2 weeks    cholecalciferol (VITAMIN D-3) 1,000 Units, Daily    HumaLOG KwikPen Insulin 100 unit/mL injection inject 6 units three times a day to cover daily meals    lisinopril 10 mg, oral, 2 times daily    multivitamin with minerals tablet 1 tablet, Daily    omega 3-dha-epa-fish oil (Fish OiL) 1,000 mg (120 mg-180 mg) capsule 1,000 mg, 2 times daily    sildenafil (VIAGRA) 100 mg, oral, As needed    Toujeo SoloStar U-300  "Insulin 300 unit/mL (1.5 mL) injection Inject 10 Units under the skin once daily at bedtime.    Unifine Pentips 31 gauge x 3/16\" needle Use to inject insulin 4 times daily       TOBACCO USE  Social History     Tobacco Use   Smoking Status Former    Types: Cigarettes   Smokeless Tobacco Never       DEPRESSION SCREEN  Over the past 2 weeks, how often have you been bothered by any of the following problems?  Little interest or pleasure in doing things: Not at all  Feeling down, depressed, or hopeless: Not at all    SURGICAL HISTORY  No past surgical history on file.       OBJECTIVE    /82   Pulse 85   Temp 35.9 °C (96.6 °F)   Ht 1.829 m (6')   Wt 114 kg (250 lb 12.8 oz)   SpO2 98%   BMI 34.01 kg/m²    BMI: Estimated body mass index is 34.01 kg/m² as calculated from the following:    Height as of this encounter: 1.829 m (6').    Weight as of this encounter: 114 kg (250 lb 12.8 oz).    BP Readings from Last 3 Encounters:   01/06/25 148/82   09/03/24 160/84   06/24/24 147/65      Wt Readings from Last 3 Encounters:   01/06/25 114 kg (250 lb 12.8 oz)   09/03/24 109 kg (240 lb 14.4 oz)   06/24/24 110 kg (242 lb 1.6 oz)        PHYSICAL EXAM  Physical Exam  Constitutional:       Appearance: Normal appearance.   HENT:      Head: Normocephalic and atraumatic.   Neck:      Vascular: No carotid bruit.   Cardiovascular:      Rate and Rhythm: Normal rate and regular rhythm.      Pulses: Normal pulses.      Heart sounds: No murmur heard.  Pulmonary:      Effort: Pulmonary effort is normal. No respiratory distress.      Breath sounds: Normal breath sounds. No wheezing.   Abdominal:      General: There is no distension.      Palpations: Abdomen is soft.      Tenderness: There is no abdominal tenderness.   Musculoskeletal:      Right lower leg: No edema.      Left lower leg: No edema.   Skin:     General: Skin is warm and dry.      Findings: No rash.      Comments: No foot ulcerations   Neurological:      General: No focal " deficit present.      Mental Status: He is alert and oriented to person, place, and time. Mental status is at baseline.   Psychiatric:         Mood and Affect: Mood normal.         Behavior: Behavior normal.         Thought Content: Thought content normal.         Judgment: Judgment normal.          ASSESSMENT AND PLAN  Assessment/Plan   Problem List Items Addressed This Visit       Diabetic neuropathy (Multi)    Benign essential hypertension    Relevant Medications    lisinopril 10 mg tablet    Other Relevant Orders    Comprehensive Metabolic Panel    CBC and Auto Differential    Pure hypercholesterolemia    Relevant Orders    Lipid Panel    Type 2 diabetes mellitus with retinopathy, with long-term current use of insulin    Overview     - did not tolerate metformin in past ([facility])   - Current plan: Toujeo 10 units at bedtime(states does not need refill at this time) .  Humalog 6 units premeal.            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  Continue current medications.  - on lisinopril and atorvastatin    Hypertension - not at goal, not presently on medication (ran out, no refills).  Was doing well on lisinopril 10 mg BID, new Rx sent.    Hyperlipidemia - continue statin.    Routine fasting labs - CBC, CMP, FLP.    Follow-up 3 months.

## 2025-03-28 LAB
NON-UH HIE A/G RATIO: 1.2
NON-UH HIE ALB: 3.6 G/DL (ref 3.4–5)
NON-UH HIE ALK PHOS: 65 UNIT/L (ref 45–117)
NON-UH HIE BASO COUNT: 0.04 X1000 (ref 0–0.2)
NON-UH HIE BASOS %: 0.8 %
NON-UH HIE BILIRUBIN, TOTAL: 0.5 MG/DL (ref 0.3–1.2)
NON-UH HIE BUN/CREAT RATIO: 14.2
NON-UH HIE BUN: 17 MG/DL (ref 9–23)
NON-UH HIE CALCIUM: 9.4 MG/DL (ref 8.7–10.4)
NON-UH HIE CALCULATED LDL CHOLESTEROL: 91 MG/DL (ref 60–130)
NON-UH HIE CALCULATED OSMOLALITY: 278 MOSM/KG (ref 275–295)
NON-UH HIE CHLORIDE: 101 MMOL/L (ref 98–107)
NON-UH HIE CHOLESTEROL: 165 MG/DL (ref 100–200)
NON-UH HIE CO2, VENOUS: 26 MMOL/L (ref 20–31)
NON-UH HIE CREATININE: 1.2 MG/DL (ref 0.6–1.1)
NON-UH HIE DIFF?: ABNORMAL
NON-UH HIE EOS COUNT: 0.13 X1000 (ref 0–0.5)
NON-UH HIE EOSIN %: 2.7 %
NON-UH HIE GFR AA: >60
NON-UH HIE GLOBULIN: 3 G/DL
NON-UH HIE GLOMERULAR FILTRATION RATE: >60 ML/MIN/1.73M?
NON-UH HIE GLUCOSE: 215 MG/DL (ref 74–106)
NON-UH HIE GOT: 21 UNIT/L (ref 15–37)
NON-UH HIE GPT: 20 UNIT/L (ref 10–49)
NON-UH HIE HCT: 37.5 % (ref 41–52)
NON-UH HIE HDL CHOLESTEROL: 59 MG/DL (ref 40–60)
NON-UH HIE HGB A1C: 7.6 %
NON-UH HIE HGB: 12.7 G/DL (ref 13.5–17.5)
NON-UH HIE INSTR WBC: 4.9
NON-UH HIE K: 5.5 MMOL/L (ref 3.5–5.1)
NON-UH HIE LYMPH %: 26.1 %
NON-UH HIE LYMPH COUNT: 1.27 X1000 (ref 1.2–4.8)
NON-UH HIE MCH: 30.8 PG (ref 27–34)
NON-UH HIE MCHC: 33.8 G/DL (ref 32–37)
NON-UH HIE MCV: 91.1 FL (ref 80–100)
NON-UH HIE MONO %: 9.5 %
NON-UH HIE MONO COUNT: 0.46 X1000 (ref 0.1–1)
NON-UH HIE MPV: 10.6 FL (ref 7.4–10.4)
NON-UH HIE NA: 135 MMOL/L (ref 135–145)
NON-UH HIE NEUTROPHIL %: 60.9 %
NON-UH HIE NEUTROPHIL COUNT (ANC): 2.97 X1000 (ref 1.4–8.8)
NON-UH HIE NUCLEATED RBC: 0 /100WBC
NON-UH HIE PLATELET: 165 X10 (ref 150–450)
NON-UH HIE RBC: 4.11 X10 (ref 4.7–6.1)
NON-UH HIE RDW: 14.1 % (ref 11.5–14.5)
NON-UH HIE TOTAL CHOL/HDL CHOL RATIO: 2.8
NON-UH HIE TOTAL PROTEIN: 6.5 G/DL (ref 5.7–8.2)
NON-UH HIE TRIGLYCERIDES: 76 MG/DL (ref 30–150)
NON-UH HIE WBC: 4.9 X10 (ref 4.5–11)

## 2025-03-29 DIAGNOSIS — D64.9 ANEMIA, UNSPECIFIED TYPE: Primary | ICD-10-CM

## 2025-03-29 DIAGNOSIS — I10 BENIGN ESSENTIAL HYPERTENSION: ICD-10-CM

## 2025-03-31 ENCOUNTER — TELEPHONE (OUTPATIENT)
Dept: RHEUMATOLOGY | Facility: CLINIC | Age: 70
End: 2025-03-31
Payer: MEDICARE

## 2025-03-31 NOTE — TELEPHONE ENCOUNTER
3/31-Patient was advised and will get new labs done in a few days.  LS     3/31-LM for the patient to call the office.  LS

## 2025-04-03 LAB
FERRITIN SERPL-MCNC: 94 NG/ML (ref 24–380)
FOLATE SERPL-MCNC: 13.3 NG/ML
IRON SATN MFR SERPL: 36 % (CALC) (ref 20–48)
IRON SERPL-MCNC: 128 MCG/DL (ref 50–180)
POTASSIUM SERPL-SCNC: 5.2 MMOL/L (ref 3.5–5.3)
TIBC SERPL-MCNC: 353 MCG/DL (CALC) (ref 250–425)
VIT B12 SERPL-MCNC: 1364 PG/ML (ref 200–1100)

## 2025-04-07 ENCOUNTER — APPOINTMENT (OUTPATIENT)
Dept: PRIMARY CARE | Facility: CLINIC | Age: 70
End: 2025-04-07
Payer: MEDICARE

## 2025-04-07 VITALS
DIASTOLIC BLOOD PRESSURE: 69 MMHG | BODY MASS INDEX: 33.87 KG/M2 | WEIGHT: 250.1 LBS | HEART RATE: 90 BPM | OXYGEN SATURATION: 97 % | TEMPERATURE: 97.4 F | SYSTOLIC BLOOD PRESSURE: 135 MMHG | HEIGHT: 72 IN

## 2025-04-07 DIAGNOSIS — E11.3499 TYPE 2 DIABETES MELLITUS WITH SEVERE NONPROLIFERATIVE RETINOPATHY, WITH LONG-TERM CURRENT USE OF INSULIN, MACULAR EDEMA PRESENCE UNSPECIFIED, UNSPECIFIED LATERALITY: Primary | ICD-10-CM

## 2025-04-07 DIAGNOSIS — E78.00 PURE HYPERCHOLESTEROLEMIA: ICD-10-CM

## 2025-04-07 DIAGNOSIS — R53.83 OTHER FATIGUE: ICD-10-CM

## 2025-04-07 DIAGNOSIS — D64.9 ANEMIA, UNSPECIFIED TYPE: ICD-10-CM

## 2025-04-07 DIAGNOSIS — Z79.4 TYPE 2 DIABETES MELLITUS WITH SEVERE NONPROLIFERATIVE RETINOPATHY, WITH LONG-TERM CURRENT USE OF INSULIN, MACULAR EDEMA PRESENCE UNSPECIFIED, UNSPECIFIED LATERALITY: Primary | ICD-10-CM

## 2025-04-07 DIAGNOSIS — I10 BENIGN ESSENTIAL HYPERTENSION: ICD-10-CM

## 2025-04-07 PROCEDURE — 3075F SYST BP GE 130 - 139MM HG: CPT | Performed by: INTERNAL MEDICINE

## 2025-04-07 PROCEDURE — 4010F ACE/ARB THERAPY RXD/TAKEN: CPT | Performed by: INTERNAL MEDICINE

## 2025-04-07 PROCEDURE — 3078F DIAST BP <80 MM HG: CPT | Performed by: INTERNAL MEDICINE

## 2025-04-07 PROCEDURE — 1036F TOBACCO NON-USER: CPT | Performed by: INTERNAL MEDICINE

## 2025-04-07 PROCEDURE — 1159F MED LIST DOCD IN RCRD: CPT | Performed by: INTERNAL MEDICINE

## 2025-04-07 PROCEDURE — 1124F ACP DISCUSS-NO DSCNMKR DOCD: CPT | Performed by: INTERNAL MEDICINE

## 2025-04-07 PROCEDURE — 3008F BODY MASS INDEX DOCD: CPT | Performed by: INTERNAL MEDICINE

## 2025-04-07 PROCEDURE — 1160F RVW MEDS BY RX/DR IN RCRD: CPT | Performed by: INTERNAL MEDICINE

## 2025-04-07 PROCEDURE — 99214 OFFICE O/P EST MOD 30 MIN: CPT | Performed by: INTERNAL MEDICINE

## 2025-04-07 ASSESSMENT — ENCOUNTER SYMPTOMS
DIARRHEA: 0
CONSTIPATION: 0
WOUND: 0
SHORTNESS OF BREATH: 0
NERVOUS/ANXIOUS: 0
COUGH: 0
VOMITING: 0
NAUSEA: 0
PALPITATIONS: 0
ABDOMINAL PAIN: 0
HEMATURIA: 0
CHILLS: 0
WHEEZING: 0
FATIGUE: 1
DYSURIA: 0
FEVER: 0
FREQUENCY: 0

## 2025-04-07 ASSESSMENT — PATIENT HEALTH QUESTIONNAIRE - PHQ9
2. FEELING DOWN, DEPRESSED OR HOPELESS: NOT AT ALL
1. LITTLE INTEREST OR PLEASURE IN DOING THINGS: NOT AT ALL
SUM OF ALL RESPONSES TO PHQ9 QUESTIONS 1 AND 2: 0

## 2025-04-07 NOTE — PROGRESS NOTES
CHIEF COMPLAINT  Diabetes and Hypertension    HISTORY OF PRESENT ILLNESS  Chris Andrade is a 69 y.o. male presents today for follow up of Diabetes and Hypertension    HPI    History reviewed and updated.  Recent labs reviewed with patient - had mild anemia.  Follow-up labs including B12, Folate, Iron studies, Ferritin were normal.  He does not sleep well, however he states he does not think he snores / has sleep apnea.  Over the last month, he has not been as active, just not motivated to exercise.  Has noticed blood sugar is elevated at times - he's very sensitive to processed carbs and tries to avoid.       REVIEW OF SYSTEMS  Review of Systems   Constitutional:  Positive for fatigue. Negative for chills and fever.   HENT:  Negative for congestion.    Respiratory:  Negative for cough, shortness of breath and wheezing.    Cardiovascular:  Negative for chest pain, palpitations and leg swelling.   Gastrointestinal:  Negative for abdominal pain, constipation, diarrhea, nausea and vomiting.   Genitourinary:  Negative for dysuria, frequency, hematuria and urgency.   Skin:  Negative for rash and wound.   Psychiatric/Behavioral:  The patient is not nervous/anxious.        ALLERGIES  Ragweed pollen    MEDICATIONS  Current Outpatient Medications   Medication Instructions    aspirin 81 mg, Daily    atorvastatin (LIPITOR) 10 mg, oral, Daily    blood-glucose sensor (FreeStyle Faith 3 Sensor) device Apply 1 sensor every 2 weeks    cholecalciferol (VITAMIN D-3) 1,000 Units, Daily    HumaLOG KwikPen Insulin 100 unit/mL injection inject 6 units three times a day to cover daily meals    lisinopril 10 mg, oral, 2 times daily    multivitamin with minerals tablet 1 tablet, Daily    omega 3-dha-epa-fish oil (Fish OiL) 1,000 mg (120 mg-180 mg) capsule 1,000 mg, 2 times daily    sildenafil (VIAGRA) 100 mg, oral, As needed    Toujeo SoloStar U-300 Insulin 300 unit/mL (1.5 mL) injection Inject 10 Units under the skin once daily at  "bedtime.    Unifine Pentips 31 gauge x 3/16\" needle Use to inject insulin 4 times daily       TOBACCO USE  Social History     Tobacco Use   Smoking Status Former    Types: Cigarettes   Smokeless Tobacco Never       DEPRESSION SCREEN  Over the past 2 weeks, how often have you been bothered by any of the following problems?  Little interest or pleasure in doing things: Not at all  Feeling down, depressed, or hopeless: Not at all    SURGICAL HISTORY  No past surgical history on file.       OBJECTIVE    /69   Pulse 90   Temp 36.3 °C (97.4 °F)   Ht 1.829 m (6')   Wt 113 kg (250 lb 1.6 oz)   SpO2 97%   BMI 33.92 kg/m²    BMI: Estimated body mass index is 33.92 kg/m² as calculated from the following:    Height as of this encounter: 1.829 m (6').    Weight as of this encounter: 113 kg (250 lb 1.6 oz).    BP Readings from Last 3 Encounters:   04/07/25 135/69   01/06/25 148/82   09/03/24 160/84      Wt Readings from Last 3 Encounters:   04/07/25 113 kg (250 lb 1.6 oz)   01/06/25 114 kg (250 lb 12.8 oz)   09/03/24 109 kg (240 lb 14.4 oz)        PHYSICAL EXAM  Physical Exam  Constitutional:       Appearance: Normal appearance.   HENT:      Head: Normocephalic and atraumatic.   Cardiovascular:      Rate and Rhythm: Normal rate and regular rhythm.      Pulses: Normal pulses.      Heart sounds: No murmur heard.  Pulmonary:      Effort: Pulmonary effort is normal. No respiratory distress.      Breath sounds: Normal breath sounds. No wheezing.   Abdominal:      General: There is no distension.      Palpations: Abdomen is soft.      Tenderness: There is no abdominal tenderness.   Musculoskeletal:      Right lower leg: No edema.      Left lower leg: No edema.   Neurological:      General: No focal deficit present.      Mental Status: He is alert and oriented to person, place, and time. Mental status is at baseline.   Psychiatric:         Mood and Affect: Mood normal.         Behavior: Behavior normal.         Thought " Content: Thought content normal.         Judgment: Judgment normal.       A1c 7.6% 3/28/25  Hgb 12.7 (normal range 13.5-17.5).          ASSESSMENT AND PLAN  Assessment/Plan   Problem List Items Addressed This Visit       Benign essential hypertension    Pure hypercholesterolemia    Type 2 diabetes mellitus with retinopathy, with long-term current use of insulin - Primary    Overview     - did not tolerate metformin in past (GI SE)            Anemia    Other fatigue       DM2 - A1c 7.6%  - Continue current medications.  - Continue diabetic diet.  Aim to walk for 15 minutes after each meal.   - on lisinopril and atorvastatin     Hypertension - at goal, continue current medication.      Hyperlipidemia - continue statin.     Anemia - mild, unspecified.  Denies obvious blood loss.  B12, folate, ferritin, and iron studies were normal.  Continue to monitor.     Fatigue - patient declines sleep apnea eval, does not think he has it.  Felt better when he was exercising, will try to get back in to routine.  Follow-up if symptoms worsen or fail to improve.      Follow-up 3 months.

## 2025-05-19 ENCOUNTER — TELEPHONE (OUTPATIENT)
Dept: RHEUMATOLOGY | Facility: CLINIC | Age: 70
End: 2025-05-19
Payer: MEDICARE

## 2025-05-19 DIAGNOSIS — E11.319 TYPE 2 DIABETES MELLITUS WITH RETINOPATHY, WITH LONG-TERM CURRENT USE OF INSULIN, MACULAR EDEMA PRESENCE UNSPECIFIED, UNSPECIFIED LATERALITY, UNSPECIFIED RETINOPATHY SEVERITY: ICD-10-CM

## 2025-05-19 DIAGNOSIS — Z79.4 TYPE 2 DIABETES MELLITUS WITH RETINOPATHY, WITH LONG-TERM CURRENT USE OF INSULIN, MACULAR EDEMA PRESENCE UNSPECIFIED, UNSPECIFIED LATERALITY, UNSPECIFIED RETINOPATHY SEVERITY: ICD-10-CM

## 2025-05-19 RX ORDER — BLOOD-GLUCOSE SENSOR
EACH MISCELLANEOUS
Qty: 6 EACH | Refills: 3 | Status: SHIPPED | OUTPATIENT
Start: 2025-05-19

## 2025-07-07 ENCOUNTER — APPOINTMENT (OUTPATIENT)
Dept: PRIMARY CARE | Facility: CLINIC | Age: 70
End: 2025-07-07
Payer: MEDICARE

## 2025-07-07 VITALS
WEIGHT: 248.2 LBS | TEMPERATURE: 97.3 F | SYSTOLIC BLOOD PRESSURE: 136 MMHG | HEART RATE: 72 BPM | OXYGEN SATURATION: 96 % | HEIGHT: 72 IN | BODY MASS INDEX: 33.62 KG/M2 | DIASTOLIC BLOOD PRESSURE: 68 MMHG

## 2025-07-07 DIAGNOSIS — I10 BENIGN ESSENTIAL HYPERTENSION: ICD-10-CM

## 2025-07-07 DIAGNOSIS — E11.3499 TYPE 2 DIABETES MELLITUS WITH SEVERE NONPROLIFERATIVE RETINOPATHY, WITH LONG-TERM CURRENT USE OF INSULIN, MACULAR EDEMA PRESENCE UNSPECIFIED, UNSPECIFIED LATERALITY: Primary | ICD-10-CM

## 2025-07-07 DIAGNOSIS — Z79.4 TYPE 2 DIABETES MELLITUS WITH SEVERE NONPROLIFERATIVE RETINOPATHY, WITH LONG-TERM CURRENT USE OF INSULIN, MACULAR EDEMA PRESENCE UNSPECIFIED, UNSPECIFIED LATERALITY: Primary | ICD-10-CM

## 2025-07-07 DIAGNOSIS — Z12.5 PROSTATE CANCER SCREENING: ICD-10-CM

## 2025-07-07 DIAGNOSIS — D64.9 ANEMIA, UNSPECIFIED TYPE: ICD-10-CM

## 2025-07-07 DIAGNOSIS — Z79.4 TYPE 2 DIABETES MELLITUS WITH RETINOPATHY, WITH LONG-TERM CURRENT USE OF INSULIN, MACULAR EDEMA PRESENCE UNSPECIFIED, UNSPECIFIED LATERALITY, UNSPECIFIED RETINOPATHY SEVERITY: ICD-10-CM

## 2025-07-07 DIAGNOSIS — E11.319 TYPE 2 DIABETES MELLITUS WITH RETINOPATHY, WITH LONG-TERM CURRENT USE OF INSULIN, MACULAR EDEMA PRESENCE UNSPECIFIED, UNSPECIFIED LATERALITY, UNSPECIFIED RETINOPATHY SEVERITY: ICD-10-CM

## 2025-07-07 PROCEDURE — 1160F RVW MEDS BY RX/DR IN RCRD: CPT | Performed by: INTERNAL MEDICINE

## 2025-07-07 PROCEDURE — 3078F DIAST BP <80 MM HG: CPT | Performed by: INTERNAL MEDICINE

## 2025-07-07 PROCEDURE — 1036F TOBACCO NON-USER: CPT | Performed by: INTERNAL MEDICINE

## 2025-07-07 PROCEDURE — 3075F SYST BP GE 130 - 139MM HG: CPT | Performed by: INTERNAL MEDICINE

## 2025-07-07 PROCEDURE — 1159F MED LIST DOCD IN RCRD: CPT | Performed by: INTERNAL MEDICINE

## 2025-07-07 PROCEDURE — 3008F BODY MASS INDEX DOCD: CPT | Performed by: INTERNAL MEDICINE

## 2025-07-07 PROCEDURE — G2211 COMPLEX E/M VISIT ADD ON: HCPCS | Performed by: INTERNAL MEDICINE

## 2025-07-07 PROCEDURE — 4010F ACE/ARB THERAPY RXD/TAKEN: CPT | Performed by: INTERNAL MEDICINE

## 2025-07-07 PROCEDURE — 99214 OFFICE O/P EST MOD 30 MIN: CPT | Performed by: INTERNAL MEDICINE

## 2025-07-07 RX ORDER — INSULIN LISPRO 100 [IU]/ML
INJECTION, SOLUTION INTRAVENOUS; SUBCUTANEOUS
Qty: 6 EACH | Refills: 3 | Status: SHIPPED | OUTPATIENT
Start: 2025-07-07

## 2025-07-07 RX ORDER — ATORVASTATIN CALCIUM 10 MG/1
10 TABLET, FILM COATED ORAL DAILY
Qty: 90 TABLET | Refills: 3 | Status: SHIPPED | OUTPATIENT
Start: 2025-07-07 | End: 2026-07-07

## 2025-07-07 ASSESSMENT — ENCOUNTER SYMPTOMS
NAUSEA: 0
FATIGUE: 1
NERVOUS/ANXIOUS: 0
DYSURIA: 0
FREQUENCY: 0
PALPITATIONS: 0
WOUND: 0
SHORTNESS OF BREATH: 0
DIARRHEA: 0
VOMITING: 0
COUGH: 0
ABDOMINAL PAIN: 0
HEMATURIA: 0
FEVER: 0
CONSTIPATION: 0
CHILLS: 0
WHEEZING: 0

## 2025-07-07 NOTE — PROGRESS NOTES
"CHIEF COMPLAINT  Diabetes and Hypertension    HISTORY OF PRESENT ILLNESS  Chris Andrade is a 70 y.o. male presents today for follow up of Diabetes and Hypertension    HPI    Blood sugar has been well controlled.  He has not been using the Toujeo.  He prefers using the Humalog, declines switch to oral medication.   More active during summer months.     REVIEW OF SYSTEMS  Review of Systems   Constitutional:  Positive for fatigue. Negative for chills and fever.   HENT:  Negative for congestion.    Respiratory:  Negative for cough, shortness of breath and wheezing.    Cardiovascular:  Negative for chest pain, palpitations and leg swelling.   Gastrointestinal:  Negative for abdominal pain, constipation, diarrhea, nausea and vomiting.   Genitourinary:  Negative for dysuria, frequency, hematuria and urgency.   Skin:  Negative for rash and wound.   Psychiatric/Behavioral:  The patient is not nervous/anxious.        ALLERGIES  Ragweed pollen    MEDICATIONS  Current Outpatient Medications   Medication Instructions    aspirin 81 mg, Daily    atorvastatin (LIPITOR) 10 mg, oral, Daily    blood-glucose sensor (FreeStyle Faith 3 Sensor) device Apply 1 sensor every 2 weeks    cholecalciferol (VITAMIN D-3) 1,000 Units, Daily    HumaLOG KwikPen Insulin 100 unit/mL pen inject 6 units three times a day to cover daily meals    lisinopril 10 mg, oral, 2 times daily    multivitamin with minerals tablet 1 tablet, Daily    omega 3-dha-epa-fish oil (Fish OiL) 1,000 mg (120 mg-180 mg) capsule 1,000 mg, 2 times daily    sildenafil (VIAGRA) 100 mg, oral, As needed    Toujeo SoloStar U-300 Insulin 300 unit/mL (1.5 mL) injection Inject 10 Units under the skin once daily at bedtime.    Unifine Pentips 31 gauge x 3/16\" needle Use to inject insulin 4 times daily       TOBACCO USE  Tobacco Use History[1]    DEPRESSION SCREEN  Over the past 2 weeks, how often have you been bothered by any of the following problems?  Little interest or pleasure in " doing things: Not at all  Feeling down, depressed, or hopeless: Not at all    SURGICAL HISTORY  No past surgical history on file.       OBJECTIVE    /68   Pulse 72   Temp 36.3 °C (97.3 °F)   Ht 1.829 m (6')   Wt 113 kg (248 lb 3.2 oz)   SpO2 96%   BMI 33.66 kg/m²    BMI: Estimated body mass index is 33.66 kg/m² as calculated from the following:    Height as of this encounter: 1.829 m (6').    Weight as of this encounter: 113 kg (248 lb 3.2 oz).    BP Readings from Last 3 Encounters:   07/07/25 136/68   04/07/25 135/69   01/06/25 148/82      Wt Readings from Last 3 Encounters:   07/07/25 113 kg (248 lb 3.2 oz)   04/07/25 113 kg (250 lb 1.6 oz)   01/06/25 114 kg (250 lb 12.8 oz)        PHYSICAL EXAM  Physical Exam  Constitutional:       Appearance: Normal appearance.   HENT:      Head: Normocephalic and atraumatic.   Cardiovascular:      Rate and Rhythm: Normal rate and regular rhythm.      Pulses: Normal pulses.      Heart sounds: No murmur heard.  Pulmonary:      Effort: Pulmonary effort is normal. No respiratory distress.      Breath sounds: Normal breath sounds. No wheezing.   Abdominal:      General: There is no distension.      Palpations: Abdomen is soft.      Tenderness: There is no abdominal tenderness.   Musculoskeletal:      Right lower leg: No edema.      Left lower leg: No edema.   Neurological:      General: No focal deficit present.      Mental Status: He is alert and oriented to person, place, and time. Mental status is at baseline.   Psychiatric:         Mood and Affect: Mood normal.         Behavior: Behavior normal.         Thought Content: Thought content normal.         Judgment: Judgment normal.          ASSESSMENT AND PLAN  Assessment/Plan   Problem List Items Addressed This Visit       Benign essential hypertension    Relevant Orders    Basic metabolic panel    Type 2 diabetes mellitus with retinopathy, with long-term current use of insulin - Primary    Overview   - did not tolerate  metformin in past (GI SE)            Relevant Medications    atorvastatin (Lipitor) 10 mg tablet    HumaLOG KwikPen Insulin 100 unit/mL pen    Other Relevant Orders    Hemoglobin A1c    Albumin-Creatinine Ratio, Urine Random    Anemia    Relevant Orders    CBC and Auto Differential    Prostate cancer screening    Relevant Orders    Prostate Specific Antigen, Screen     DM2 - Check A1c  - urine albumin / creatinine ratio, BUN, Cr, GFR 7/7/25  - Continue current medications.  - Continue diabetic diet.  Aim to walk for 15 minutes after each meal.   - on lisinopril and atorvastatin     Hypertension - at goal, continue current medication.   - mild hyperkalemia with last panel, was resolved on recheck.  Will repeat today.     Anemia - mild, unspecified.  Denies obvious blood loss.  B12, folate, ferritin, and iron studies were normal.   Repeat CBC today.    PSA 7/7/25.    Follow-up 3 months.               [1]   Social History  Tobacco Use   Smoking Status Former    Types: Cigarettes   Smokeless Tobacco Never

## 2025-07-08 LAB
ALBUMIN/CREAT UR: 116 MG/G CREAT
ANION GAP SERPL CALCULATED.4IONS-SCNC: 11 MMOL/L (CALC) (ref 7–17)
BASOPHILS # BLD AUTO: 39 CELLS/UL (ref 0–200)
BASOPHILS NFR BLD AUTO: 0.7 %
BUN SERPL-MCNC: 16 MG/DL (ref 7–25)
BUN/CREAT SERPL: ABNORMAL (CALC) (ref 6–22)
CALCIUM SERPL-MCNC: 9 MG/DL (ref 8.6–10.3)
CHLORIDE SERPL-SCNC: 98 MMOL/L (ref 98–110)
CO2 SERPL-SCNC: 22 MMOL/L (ref 20–32)
CREAT SERPL-MCNC: 1.19 MG/DL (ref 0.7–1.35)
CREAT UR-MCNC: 103 MG/DL (ref 20–320)
EGFRCR SERPLBLD CKD-EPI 2021: 67 ML/MIN/1.73M2
EOSINOPHIL # BLD AUTO: 132 CELLS/UL (ref 15–500)
EOSINOPHIL NFR BLD AUTO: 2.4 %
ERYTHROCYTE [DISTWIDTH] IN BLOOD BY AUTOMATED COUNT: 13.2 % (ref 11–15)
EST. AVERAGE GLUCOSE BLD GHB EST-MCNC: 183 MG/DL
EST. AVERAGE GLUCOSE BLD GHB EST-SCNC: 10.1 MMOL/L
GLUCOSE SERPL-MCNC: 207 MG/DL (ref 65–99)
HBA1C MFR BLD: 8 %
HCT VFR BLD AUTO: 37.5 % (ref 38.5–50)
HGB BLD-MCNC: 12.4 G/DL (ref 13.2–17.1)
LYMPHOCYTES # BLD AUTO: 1441 CELLS/UL (ref 850–3900)
LYMPHOCYTES NFR BLD AUTO: 26.2 %
MCH RBC QN AUTO: 31.4 PG (ref 27–33)
MCHC RBC AUTO-ENTMCNC: 33.1 G/DL (ref 32–36)
MCV RBC AUTO: 94.9 FL (ref 80–100)
MICROALBUMIN UR-MCNC: 11.9 MG/DL
MONOCYTES # BLD AUTO: 457 CELLS/UL (ref 200–950)
MONOCYTES NFR BLD AUTO: 8.3 %
NEUTROPHILS # BLD AUTO: 3432 CELLS/UL (ref 1500–7800)
NEUTROPHILS NFR BLD AUTO: 62.4 %
PLATELET # BLD AUTO: 200 THOUSAND/UL (ref 140–400)
PMV BLD REES-ECKER: 12.2 FL (ref 7.5–12.5)
POTASSIUM SERPL-SCNC: 5.1 MMOL/L (ref 3.5–5.3)
PSA SERPL-MCNC: 0.93 NG/ML
RBC # BLD AUTO: 3.95 MILLION/UL (ref 4.2–5.8)
SODIUM SERPL-SCNC: 131 MMOL/L (ref 135–146)
WBC # BLD AUTO: 5.5 THOUSAND/UL (ref 3.8–10.8)

## 2025-07-13 DIAGNOSIS — D64.9 ANEMIA, UNSPECIFIED TYPE: Primary | ICD-10-CM

## 2025-11-25 ENCOUNTER — APPOINTMENT (OUTPATIENT)
Dept: PRIMARY CARE | Facility: CLINIC | Age: 70
End: 2025-11-25
Payer: MEDICARE